# Patient Record
Sex: FEMALE | Race: BLACK OR AFRICAN AMERICAN | Employment: UNEMPLOYED | URBAN - METROPOLITAN AREA
[De-identification: names, ages, dates, MRNs, and addresses within clinical notes are randomized per-mention and may not be internally consistent; named-entity substitution may affect disease eponyms.]

---

## 2018-09-13 LAB
CHLAMYDIA, EXTERNAL: NEGATIVE
HBSAG, EXTERNAL: NEGATIVE
HIV, EXTERNAL: NORMAL
N. GONORRHEA, EXTERNAL: NEGATIVE
RPR, EXTERNAL: NORMAL
RUBELLA, EXTERNAL: NORMAL

## 2019-02-13 ENCOUNTER — HOSPITAL ENCOUNTER (OUTPATIENT)
Dept: LAB | Age: 44
Discharge: HOME | End: 2019-02-13

## 2019-02-13 LAB — XX-LABCORP SPECIMEN COL,LCBCF: NORMAL

## 2019-02-13 PROCEDURE — 99001 SPECIMEN HANDLING PT-LAB: CPT

## 2019-02-15 ENCOUNTER — ANESTHESIA EVENT (OUTPATIENT)
Dept: LABOR AND DELIVERY | Age: 44
DRG: 540 | End: 2019-02-15
Payer: MEDICAID

## 2019-02-15 ENCOUNTER — ANESTHESIA (OUTPATIENT)
Dept: LABOR AND DELIVERY | Age: 44
DRG: 540 | End: 2019-02-15
Payer: MEDICAID

## 2019-02-15 ENCOUNTER — HOSPITAL ENCOUNTER (INPATIENT)
Age: 44
LOS: 4 days | Discharge: HOME | DRG: 540 | End: 2019-02-19
Attending: OBSTETRICS & GYNECOLOGY | Admitting: OBSTETRICS & GYNECOLOGY
Payer: MEDICAID

## 2019-02-15 DIAGNOSIS — Z98.891 S/P EMERGENCY CESAREAN SECTION: Primary | ICD-10-CM

## 2019-02-15 LAB
APTT PPP: 26.8 SEC (ref 23–36.4)
BASOPHILS # BLD: 0 K/UL (ref 0–0.1)
BASOPHILS NFR BLD: 0 % (ref 0–2)
D DIMER PPP FEU-MCNC: 1.59 UG/ML(FEU)
DIFFERENTIAL METHOD BLD: ABNORMAL
EOSINOPHIL # BLD: 0.1 K/UL (ref 0–0.4)
EOSINOPHIL NFR BLD: 1 % (ref 0–5)
ERYTHROCYTE [DISTWIDTH] IN BLOOD BY AUTOMATED COUNT: 14.5 % (ref 11.6–14.5)
FDP BLD QL AGGL: <5 UG/ML
FIBRINOGEN PPP-MCNC: 431 MG/DL (ref 210–451)
HCT VFR BLD AUTO: 29.6 % (ref 35–45)
HGB BLD-MCNC: 10.1 G/DL (ref 12–16)
LYMPHOCYTES # BLD: 3.5 K/UL (ref 0.9–3.6)
LYMPHOCYTES NFR BLD: 22 % (ref 21–52)
MCH RBC QN AUTO: 28.5 PG (ref 24–34)
MCHC RBC AUTO-ENTMCNC: 34.1 G/DL (ref 31–37)
MCV RBC AUTO: 83.6 FL (ref 74–97)
MONOCYTES # BLD: 1.3 K/UL (ref 0.05–1.2)
MONOCYTES NFR BLD: 8 % (ref 3–10)
NEUTS SEG # BLD: 10.9 K/UL (ref 1.8–8)
NEUTS SEG NFR BLD: 69 % (ref 40–73)
PLATELET # BLD AUTO: 233 K/UL (ref 135–420)
PMV BLD AUTO: 11.3 FL (ref 9.2–11.8)
RBC # BLD AUTO: 3.54 M/UL (ref 4.2–5.3)
WBC # BLD AUTO: 15.8 K/UL (ref 4.6–13.2)

## 2019-02-15 PROCEDURE — 75410000003 HC RECOV DEL/VAG/CSECN EA 0.5 HR: Performed by: OBSTETRICS & GYNECOLOGY

## 2019-02-15 PROCEDURE — 77030008683 HC TU ET CUF COVD -A: Performed by: ANESTHESIOLOGY

## 2019-02-15 PROCEDURE — 65270000029 HC RM PRIVATE

## 2019-02-15 PROCEDURE — 85362 FIBRIN DEGRADATION PRODUCTS: CPT

## 2019-02-15 PROCEDURE — 74011000258 HC RX REV CODE- 258

## 2019-02-15 PROCEDURE — 86900 BLOOD TYPING SEROLOGIC ABO: CPT

## 2019-02-15 PROCEDURE — 77030031139 HC SUT VCRL2 J&J -A: Performed by: OBSTETRICS & GYNECOLOGY

## 2019-02-15 PROCEDURE — 85379 FIBRIN DEGRADATION QUANT: CPT

## 2019-02-15 PROCEDURE — 74011250636 HC RX REV CODE- 250/636

## 2019-02-15 PROCEDURE — 77030002888 HC SUT CHRMC J&J -A: Performed by: OBSTETRICS & GYNECOLOGY

## 2019-02-15 PROCEDURE — 77030002933 HC SUT MCRYL J&J -A: Performed by: OBSTETRICS & GYNECOLOGY

## 2019-02-15 PROCEDURE — 76010000392 HC C SECN EA ADDL 0.5 HR: Performed by: OBSTETRICS & GYNECOLOGY

## 2019-02-15 PROCEDURE — 86870 RBC ANTIBODY IDENTIFICATION: CPT

## 2019-02-15 PROCEDURE — 85025 COMPLETE CBC W/AUTO DIFF WBC: CPT

## 2019-02-15 PROCEDURE — 85730 THROMBOPLASTIN TIME PARTIAL: CPT

## 2019-02-15 PROCEDURE — 85384 FIBRINOGEN ACTIVITY: CPT

## 2019-02-15 PROCEDURE — 76060000078 HC EPIDURAL ANESTHESIA: Performed by: OBSTETRICS & GYNECOLOGY

## 2019-02-15 PROCEDURE — 76010000391 HC C SECN FIRST 1 HR: Performed by: OBSTETRICS & GYNECOLOGY

## 2019-02-15 PROCEDURE — 74011250636 HC RX REV CODE- 250/636: Performed by: OBSTETRICS & GYNECOLOGY

## 2019-02-15 PROCEDURE — 74011250637 HC RX REV CODE- 250/637

## 2019-02-15 PROCEDURE — 77030002974 HC SUT PLN J&J -A: Performed by: OBSTETRICS & GYNECOLOGY

## 2019-02-15 PROCEDURE — 74011000250 HC RX REV CODE- 250

## 2019-02-15 PROCEDURE — 86920 COMPATIBILITY TEST SPIN: CPT

## 2019-02-15 RX ORDER — HYDROMORPHONE HYDROCHLORIDE 1 MG/ML
0.5 INJECTION, SOLUTION INTRAMUSCULAR; INTRAVENOUS; SUBCUTANEOUS
Status: COMPLETED | OUTPATIENT
Start: 2019-02-15 | End: 2019-02-16

## 2019-02-15 RX ORDER — HYDROCODONE BITARTRATE AND ACETAMINOPHEN 5; 325 MG/1; MG/1
1 TABLET ORAL AS NEEDED
Status: DISCONTINUED | OUTPATIENT
Start: 2019-02-15 | End: 2019-02-19 | Stop reason: HOSPADM

## 2019-02-15 RX ORDER — CEFAZOLIN SODIUM 2 G/50ML
SOLUTION INTRAVENOUS
Status: DISPENSED
Start: 2019-02-15 | End: 2019-02-16

## 2019-02-15 RX ORDER — SODIUM CHLORIDE, SODIUM LACTATE, POTASSIUM CHLORIDE, CALCIUM CHLORIDE 600; 310; 30; 20 MG/100ML; MG/100ML; MG/100ML; MG/100ML
50 INJECTION, SOLUTION INTRAVENOUS CONTINUOUS
Status: DISCONTINUED | OUTPATIENT
Start: 2019-02-16 | End: 2019-02-19 | Stop reason: HOSPADM

## 2019-02-15 RX ORDER — FENTANYL CITRATE 50 UG/ML
INJECTION, SOLUTION INTRAMUSCULAR; INTRAVENOUS AS NEEDED
Status: DISCONTINUED | OUTPATIENT
Start: 2019-02-15 | End: 2019-02-16 | Stop reason: HOSPADM

## 2019-02-15 RX ORDER — SODIUM CHLORIDE 9 MG/ML
250 INJECTION, SOLUTION INTRAVENOUS AS NEEDED
Status: DISCONTINUED | OUTPATIENT
Start: 2019-02-15 | End: 2019-02-18 | Stop reason: SDUPTHER

## 2019-02-15 RX ORDER — ONDANSETRON 2 MG/ML
4 INJECTION INTRAMUSCULAR; INTRAVENOUS
Status: DISPENSED | OUTPATIENT
Start: 2019-02-15 | End: 2019-02-16

## 2019-02-15 RX ORDER — OXYTOCIN 10 [USP'U]/ML
INJECTION, SOLUTION INTRAMUSCULAR; INTRAVENOUS AS NEEDED
Status: DISCONTINUED | OUTPATIENT
Start: 2019-02-15 | End: 2019-02-16 | Stop reason: HOSPADM

## 2019-02-15 RX ORDER — SUCCINYLCHOLINE CHLORIDE 20 MG/ML
INJECTION INTRAMUSCULAR; INTRAVENOUS AS NEEDED
Status: DISCONTINUED | OUTPATIENT
Start: 2019-02-15 | End: 2019-02-16 | Stop reason: HOSPADM

## 2019-02-15 RX ORDER — TRISODIUM CITRATE DIHYDRATE AND CITRIC ACID MONOHYDRATE 500; 334 MG/5ML; MG/5ML
SOLUTION ORAL
Status: COMPLETED
Start: 2019-02-15 | End: 2019-02-15

## 2019-02-15 RX ORDER — SODIUM CHLORIDE 0.9 % (FLUSH) 0.9 %
5-40 SYRINGE (ML) INJECTION EVERY 8 HOURS
Status: DISCONTINUED | OUTPATIENT
Start: 2019-02-15 | End: 2019-02-16 | Stop reason: HOSPADM

## 2019-02-15 RX ORDER — TRANEXAMIC ACID 100 MG/ML
1 INJECTION, SOLUTION INTRAVENOUS ONCE
Status: DISPENSED | OUTPATIENT
Start: 2019-02-15 | End: 2019-02-16

## 2019-02-15 RX ORDER — SODIUM CHLORIDE 0.9 % (FLUSH) 0.9 %
5-40 SYRINGE (ML) INJECTION AS NEEDED
Status: DISCONTINUED | OUTPATIENT
Start: 2019-02-15 | End: 2019-02-16 | Stop reason: HOSPADM

## 2019-02-15 RX ORDER — PROPOFOL 10 MG/ML
INJECTION, EMULSION INTRAVENOUS AS NEEDED
Status: DISCONTINUED | OUTPATIENT
Start: 2019-02-15 | End: 2019-02-16 | Stop reason: HOSPADM

## 2019-02-15 RX ORDER — FAMOTIDINE 10 MG/ML
INJECTION INTRAVENOUS
Status: COMPLETED
Start: 2019-02-15 | End: 2019-02-15

## 2019-02-15 RX ORDER — ONDANSETRON 2 MG/ML
INJECTION INTRAMUSCULAR; INTRAVENOUS AS NEEDED
Status: DISCONTINUED | OUTPATIENT
Start: 2019-02-15 | End: 2019-02-16 | Stop reason: HOSPADM

## 2019-02-15 RX ORDER — FENTANYL CITRATE 50 UG/ML
25 INJECTION, SOLUTION INTRAMUSCULAR; INTRAVENOUS AS NEEDED
Status: DISCONTINUED | OUTPATIENT
Start: 2019-02-15 | End: 2019-02-19 | Stop reason: HOSPADM

## 2019-02-15 RX ORDER — BETAMETHASONE SODIUM PHOSPHATE AND BETAMETHASONE ACETATE 3; 3 MG/ML; MG/ML
12 INJECTION, SUSPENSION INTRA-ARTICULAR; INTRALESIONAL; INTRAMUSCULAR; SOFT TISSUE ONCE
Status: COMPLETED | OUTPATIENT
Start: 2019-02-15 | End: 2019-02-15

## 2019-02-15 RX ORDER — LIDOCAINE HYDROCHLORIDE 20 MG/ML
INJECTION, SOLUTION EPIDURAL; INFILTRATION; INTRACAUDAL; PERINEURAL AS NEEDED
Status: DISCONTINUED | OUTPATIENT
Start: 2019-02-15 | End: 2019-02-16 | Stop reason: HOSPADM

## 2019-02-15 RX ORDER — SODIUM CHLORIDE, SODIUM LACTATE, POTASSIUM CHLORIDE, CALCIUM CHLORIDE 600; 310; 30; 20 MG/100ML; MG/100ML; MG/100ML; MG/100ML
INJECTION, SOLUTION INTRAVENOUS
Status: DISCONTINUED | OUTPATIENT
Start: 2019-02-15 | End: 2019-02-16 | Stop reason: HOSPADM

## 2019-02-15 RX ORDER — OXYTOCIN/RINGER'S LACTATE 20/1000 ML
PLASTIC BAG, INJECTION (ML) INTRAVENOUS
Status: DISPENSED
Start: 2019-02-15 | End: 2019-02-16

## 2019-02-15 RX ADMIN — ONDANSETRON 4 MG: 2 INJECTION INTRAMUSCULAR; INTRAVENOUS at 23:10

## 2019-02-15 RX ADMIN — FENTANYL CITRATE 50 MCG: 50 INJECTION, SOLUTION INTRAMUSCULAR; INTRAVENOUS at 23:13

## 2019-02-15 RX ADMIN — FENTANYL CITRATE 50 MCG: 50 INJECTION, SOLUTION INTRAMUSCULAR; INTRAVENOUS at 23:07

## 2019-02-15 RX ADMIN — PROPOFOL 130 MG: 10 INJECTION, EMULSION INTRAVENOUS at 23:00

## 2019-02-15 RX ADMIN — BETAMETHASONE ACETATE AND BETAMETHASONE SODIUM PHOSPHATE 12 MG: 3; 3 INJECTION, SUSPENSION INTRA-ARTICULAR; INTRALESIONAL; INTRAMUSCULAR; SOFT TISSUE at 22:42

## 2019-02-15 RX ADMIN — OXYTOCIN 20 UNITS: 10 INJECTION, SOLUTION INTRAMUSCULAR; INTRAVENOUS at 23:07

## 2019-02-15 RX ADMIN — FAMOTIDINE: 10 INJECTION, SOLUTION INTRAVENOUS at 22:39

## 2019-02-15 RX ADMIN — OXYTOCIN 20 UNITS: 10 INJECTION, SOLUTION INTRAMUSCULAR; INTRAVENOUS at 23:13

## 2019-02-15 RX ADMIN — LIDOCAINE HYDROCHLORIDE 80 MG: 20 INJECTION, SOLUTION EPIDURAL; INFILTRATION; INTRACAUDAL; PERINEURAL at 23:00

## 2019-02-15 RX ADMIN — SODIUM CHLORIDE, SODIUM LACTATE, POTASSIUM CHLORIDE, CALCIUM CHLORIDE: 600; 310; 30; 20 INJECTION, SOLUTION INTRAVENOUS at 22:47

## 2019-02-15 RX ADMIN — Medication 2 G: at 22:52

## 2019-02-15 RX ADMIN — SUCCINYLCHOLINE CHLORIDE 140 MG: 20 INJECTION INTRAMUSCULAR; INTRAVENOUS at 23:00

## 2019-02-15 RX ADMIN — SODIUM CHLORIDE, SODIUM LACTATE, POTASSIUM CHLORIDE, CALCIUM CHLORIDE: 600; 310; 30; 20 INJECTION, SOLUTION INTRAVENOUS at 23:07

## 2019-02-15 RX ADMIN — SODIUM CITRATE AND CITRIC ACID MONOHYDRATE 30 ML: 500; 334 SOLUTION ORAL at 22:39

## 2019-02-16 ENCOUNTER — DOCUMENTATION ONLY (OUTPATIENT)
Dept: OBGYN CLINIC | Age: 44
End: 2019-02-16

## 2019-02-16 PROBLEM — Z34.90 PREGNANCY: Status: ACTIVE | Noted: 2019-02-16

## 2019-02-16 PROBLEM — O44.00 PLACENTA PREVIA AFFECTING DELIVERY: Status: ACTIVE | Noted: 2019-02-16

## 2019-02-16 LAB
BASOPHILS # BLD: 0 K/UL (ref 0–0.06)
BASOPHILS # BLD: 0.3 K/UL (ref 0–0.06)
BASOPHILS NFR BLD: 0 % (ref 0–3)
BASOPHILS NFR BLD: 1 % (ref 0–3)
DIFFERENTIAL METHOD BLD: ABNORMAL
DIFFERENTIAL METHOD BLD: ABNORMAL
EOSINOPHIL # BLD: 0 K/UL (ref 0–0.4)
EOSINOPHIL # BLD: 0 K/UL (ref 0–0.4)
EOSINOPHIL NFR BLD: 0 % (ref 0–5)
EOSINOPHIL NFR BLD: 0 % (ref 0–5)
ERYTHROCYTE [DISTWIDTH] IN BLOOD BY AUTOMATED COUNT: 14.4 % (ref 11.6–14.5)
ERYTHROCYTE [DISTWIDTH] IN BLOOD BY AUTOMATED COUNT: 14.4 % (ref 11.6–14.5)
ERYTHROCYTE [DISTWIDTH] IN BLOOD BY AUTOMATED COUNT: 14.7 % (ref 11.6–14.5)
ERYTHROCYTE [DISTWIDTH] IN BLOOD BY AUTOMATED COUNT: 14.8 % (ref 11.6–14.5)
FIBRINOGEN PPP-MCNC: 177 MG/DL (ref 210–451)
HCT VFR BLD AUTO: 20.7 % (ref 35–45)
HCT VFR BLD AUTO: 23.8 % (ref 35–45)
HCT VFR BLD AUTO: 25 % (ref 35–45)
HCT VFR BLD AUTO: 25.3 % (ref 35–45)
HGB BLD-MCNC: 6.8 G/DL (ref 12–16)
HGB BLD-MCNC: 8 G/DL (ref 12–16)
HGB BLD-MCNC: 8.3 G/DL (ref 12–16)
HGB BLD-MCNC: 8.5 G/DL (ref 12–16)
INR PPP: 1.2 (ref 0.8–1.2)
LYMPHOCYTES # BLD: 0.7 K/UL (ref 0.8–3.5)
LYMPHOCYTES # BLD: 1.3 K/UL (ref 0.8–3.5)
LYMPHOCYTES NFR BLD: 3 % (ref 20–51)
LYMPHOCYTES NFR BLD: 5 % (ref 20–51)
MCH RBC QN AUTO: 27.9 PG (ref 24–34)
MCH RBC QN AUTO: 27.9 PG (ref 24–34)
MCH RBC QN AUTO: 28.4 PG (ref 24–34)
MCH RBC QN AUTO: 29.6 PG (ref 24–34)
MCHC RBC AUTO-ENTMCNC: 32 G/DL (ref 31–37)
MCHC RBC AUTO-ENTMCNC: 32.9 G/DL (ref 31–37)
MCHC RBC AUTO-ENTMCNC: 33.6 G/DL (ref 31–37)
MCHC RBC AUTO-ENTMCNC: 34.9 G/DL (ref 31–37)
MCV RBC AUTO: 84.6 FL (ref 74–97)
MCV RBC AUTO: 84.8 FL (ref 74–97)
MCV RBC AUTO: 85 FL (ref 74–97)
MCV RBC AUTO: 87.1 FL (ref 74–97)
MONOCYTES # BLD: 1.8 K/UL (ref 0–1)
MONOCYTES # BLD: 1.9 K/UL (ref 0–1)
MONOCYTES NFR BLD: 7 % (ref 2–9)
MONOCYTES NFR BLD: 8 % (ref 2–9)
NEUTS BAND NFR BLD MANUAL: 15 % (ref 0–5)
NEUTS BAND NFR BLD MANUAL: 20 % (ref 0–5)
NEUTS SEG # BLD: 21.3 K/UL (ref 1.8–8)
NEUTS SEG # BLD: 22.1 K/UL (ref 1.8–8)
NEUTS SEG NFR BLD: 67 % (ref 42–75)
NEUTS SEG NFR BLD: 74 % (ref 42–75)
PLATELET # BLD AUTO: 107 K/UL (ref 135–420)
PLATELET # BLD AUTO: 129 K/UL (ref 135–420)
PLATELET # BLD AUTO: 145 K/UL (ref 135–420)
PLATELET # BLD AUTO: 159 K/UL (ref 135–420)
PLATELET COMMENTS,PCOM: ABNORMAL
PLATELET COMMENTS,PCOM: ABNORMAL
PMV BLD AUTO: 10.8 FL (ref 9.2–11.8)
PMV BLD AUTO: 11.1 FL (ref 9.2–11.8)
PMV BLD AUTO: 11.3 FL (ref 9.2–11.8)
PMV BLD AUTO: 11.5 FL (ref 9.2–11.8)
PROTHROMBIN TIME: 15.1 SEC (ref 11.5–15.2)
RBC # BLD AUTO: 2.44 M/UL (ref 4.2–5.3)
RBC # BLD AUTO: 2.8 M/UL (ref 4.2–5.3)
RBC # BLD AUTO: 2.87 M/UL (ref 4.2–5.3)
RBC # BLD AUTO: 2.99 M/UL (ref 4.2–5.3)
RBC MORPH BLD: ABNORMAL
WBC # BLD AUTO: 23.9 K/UL (ref 4.6–13.2)
WBC # BLD AUTO: 25.5 K/UL (ref 4.6–13.2)
WBC # BLD AUTO: 27.9 K/UL (ref 4.6–13.2)
WBC # BLD AUTO: 29.7 K/UL (ref 4.6–13.2)

## 2019-02-16 PROCEDURE — 59025 FETAL NON-STRESS TEST: CPT

## 2019-02-16 PROCEDURE — 74011250636 HC RX REV CODE- 250/636: Performed by: OBSTETRICS & GYNECOLOGY

## 2019-02-16 PROCEDURE — P9016 RBC LEUKOCYTES REDUCED: HCPCS

## 2019-02-16 PROCEDURE — 36430 TRANSFUSION BLD/BLD COMPNT: CPT

## 2019-02-16 PROCEDURE — 75410000003 HC RECOV DEL/VAG/CSECN EA 0.5 HR

## 2019-02-16 PROCEDURE — 75410000002 HC LABOR FEE PER 1 HR

## 2019-02-16 PROCEDURE — 85610 PROTHROMBIN TIME: CPT

## 2019-02-16 PROCEDURE — 74011250636 HC RX REV CODE- 250/636: Performed by: NURSE ANESTHETIST, CERTIFIED REGISTERED

## 2019-02-16 PROCEDURE — 88307 TISSUE EXAM BY PATHOLOGIST: CPT

## 2019-02-16 PROCEDURE — 77030027138 HC INCENT SPIROMETER -A

## 2019-02-16 PROCEDURE — 74011250637 HC RX REV CODE- 250/637: Performed by: OBSTETRICS & GYNECOLOGY

## 2019-02-16 PROCEDURE — 85025 COMPLETE CBC W/AUTO DIFF WBC: CPT

## 2019-02-16 PROCEDURE — 86902 BLOOD TYPE ANTIGEN DONOR EA: CPT

## 2019-02-16 PROCEDURE — 30233N1 TRANSFUSION OF NONAUTOLOGOUS RED BLOOD CELLS INTO PERIPHERAL VEIN, PERCUTANEOUS APPROACH: ICD-10-PCS | Performed by: OBSTETRICS & GYNECOLOGY

## 2019-02-16 PROCEDURE — 36415 COLL VENOUS BLD VENIPUNCTURE: CPT

## 2019-02-16 PROCEDURE — 74011250636 HC RX REV CODE- 250/636

## 2019-02-16 PROCEDURE — 65270000029 HC RM PRIVATE

## 2019-02-16 PROCEDURE — 86921 COMPATIBILITY TEST INCUBATE: CPT

## 2019-02-16 PROCEDURE — 85027 COMPLETE CBC AUTOMATED: CPT

## 2019-02-16 PROCEDURE — 99284 EMERGENCY DEPT VISIT MOD MDM: CPT

## 2019-02-16 PROCEDURE — 86922 COMPATIBILITY TEST ANTIGLOB: CPT

## 2019-02-16 PROCEDURE — 85384 FIBRINOGEN ACTIVITY: CPT

## 2019-02-16 RX ORDER — SODIUM CHLORIDE 0.9 % (FLUSH) 0.9 %
5-40 SYRINGE (ML) INJECTION AS NEEDED
Status: DISCONTINUED | OUTPATIENT
Start: 2019-02-16 | End: 2019-02-19 | Stop reason: HOSPADM

## 2019-02-16 RX ORDER — PROMETHAZINE HYDROCHLORIDE 25 MG/ML
25 INJECTION, SOLUTION INTRAMUSCULAR; INTRAVENOUS
Status: DISCONTINUED | OUTPATIENT
Start: 2019-02-16 | End: 2019-02-19 | Stop reason: HOSPADM

## 2019-02-16 RX ORDER — CEFAZOLIN SODIUM 2 G/50ML
2 SOLUTION INTRAVENOUS ONCE
Status: COMPLETED | OUTPATIENT
Start: 2019-02-16 | End: 2019-02-17

## 2019-02-16 RX ORDER — CEFAZOLIN SODIUM 2 G/50ML
2 SOLUTION INTRAVENOUS EVERY 8 HOURS
Status: DISPENSED | OUTPATIENT
Start: 2019-02-16 | End: 2019-02-17

## 2019-02-16 RX ORDER — SIMETHICONE 80 MG
80 TABLET,CHEWABLE ORAL
Status: DISCONTINUED | OUTPATIENT
Start: 2019-02-16 | End: 2019-02-19 | Stop reason: HOSPADM

## 2019-02-16 RX ORDER — OXYCODONE AND ACETAMINOPHEN 5; 325 MG/1; MG/1
1-2 TABLET ORAL
Status: DISCONTINUED | OUTPATIENT
Start: 2019-02-16 | End: 2019-02-19 | Stop reason: HOSPADM

## 2019-02-16 RX ORDER — OXYTOCIN/RINGER'S LACTATE 20/1000 ML
125 PLASTIC BAG, INJECTION (ML) INTRAVENOUS CONTINUOUS
Status: DISCONTINUED | OUTPATIENT
Start: 2019-02-16 | End: 2019-02-19 | Stop reason: HOSPADM

## 2019-02-16 RX ORDER — MISOPROSTOL 200 UG/1
1000 TABLET ORAL ONCE
Status: COMPLETED | OUTPATIENT
Start: 2019-02-16 | End: 2019-02-16

## 2019-02-16 RX ORDER — PHENYLEPHRINE HCL IN 0.9% NACL 1 MG/10 ML
SYRINGE (ML) INTRAVENOUS AS NEEDED
Status: DISCONTINUED | OUTPATIENT
Start: 2019-02-16 | End: 2019-02-16 | Stop reason: HOSPADM

## 2019-02-16 RX ORDER — IBUPROFEN 400 MG/1
800 TABLET ORAL
Status: DISCONTINUED | OUTPATIENT
Start: 2019-02-19 | End: 2019-02-16

## 2019-02-16 RX ORDER — ZOLPIDEM TARTRATE 5 MG/1
5 TABLET ORAL
Status: DISCONTINUED | OUTPATIENT
Start: 2019-02-16 | End: 2019-02-19 | Stop reason: HOSPADM

## 2019-02-16 RX ORDER — SODIUM CHLORIDE 0.9 % (FLUSH) 0.9 %
5-40 SYRINGE (ML) INJECTION EVERY 8 HOURS
Status: DISCONTINUED | OUTPATIENT
Start: 2019-02-16 | End: 2019-02-19 | Stop reason: HOSPADM

## 2019-02-16 RX ORDER — METHYLERGONOVINE MALEATE 0.2 MG/ML
0.2 INJECTION INTRAVENOUS ONCE
Status: COMPLETED | OUTPATIENT
Start: 2019-02-16 | End: 2019-02-16

## 2019-02-16 RX ORDER — MORPHINE SULFATE 2 MG/ML
2 INJECTION, SOLUTION INTRAMUSCULAR; INTRAVENOUS
Status: DISCONTINUED | OUTPATIENT
Start: 2019-02-16 | End: 2019-02-16

## 2019-02-16 RX ORDER — MISOPROSTOL 200 UG/1
TABLET ORAL
Status: DISPENSED
Start: 2019-02-16 | End: 2019-02-16

## 2019-02-16 RX ORDER — OXYTOCIN/RINGER'S LACTATE 20/1000 ML
PLASTIC BAG, INJECTION (ML) INTRAVENOUS
Status: DISPENSED
Start: 2019-02-16 | End: 2019-02-16

## 2019-02-16 RX ORDER — SODIUM CHLORIDE, SODIUM LACTATE, POTASSIUM CHLORIDE, CALCIUM CHLORIDE 600; 310; 30; 20 MG/100ML; MG/100ML; MG/100ML; MG/100ML
125 INJECTION, SOLUTION INTRAVENOUS CONTINUOUS
Status: DISPENSED | OUTPATIENT
Start: 2019-02-16 | End: 2019-02-17

## 2019-02-16 RX ORDER — SODIUM CHLORIDE 9 MG/ML
250 INJECTION, SOLUTION INTRAVENOUS AS NEEDED
Status: DISCONTINUED | OUTPATIENT
Start: 2019-02-16 | End: 2019-02-19 | Stop reason: SDUPTHER

## 2019-02-16 RX ORDER — CEFAZOLIN SODIUM IN 0.9 % NACL 2 G/100 ML
PLASTIC BAG, INJECTION (ML) INTRAVENOUS AS NEEDED
Status: DISCONTINUED | OUTPATIENT
Start: 2019-02-15 | End: 2019-02-16 | Stop reason: HOSPADM

## 2019-02-16 RX ORDER — FACIAL-BODY WIPES
10 EACH TOPICAL
Status: DISCONTINUED | OUTPATIENT
Start: 2019-02-16 | End: 2019-02-19 | Stop reason: HOSPADM

## 2019-02-16 RX ORDER — METHYLERGONOVINE MALEATE 0.2 MG/ML
INJECTION INTRAVENOUS
Status: COMPLETED
Start: 2019-02-16 | End: 2019-02-16

## 2019-02-16 RX ORDER — KETOROLAC TROMETHAMINE 30 MG/ML
30 INJECTION, SOLUTION INTRAMUSCULAR; INTRAVENOUS EVERY 6 HOURS
Status: DISCONTINUED | OUTPATIENT
Start: 2019-02-16 | End: 2019-02-16

## 2019-02-16 RX ORDER — ACETAMINOPHEN 325 MG/1
650 TABLET ORAL
Status: DISCONTINUED | OUTPATIENT
Start: 2019-02-16 | End: 2019-02-19 | Stop reason: HOSPADM

## 2019-02-16 RX ORDER — HYDROMORPHONE HYDROCHLORIDE 2 MG/ML
INJECTION, SOLUTION INTRAMUSCULAR; INTRAVENOUS; SUBCUTANEOUS AS NEEDED
Status: DISCONTINUED | OUTPATIENT
Start: 2019-02-16 | End: 2019-02-16 | Stop reason: HOSPADM

## 2019-02-16 RX ADMIN — HYDROMORPHONE HYDROCHLORIDE 0.5 MG: 1 INJECTION, SOLUTION INTRAMUSCULAR; INTRAVENOUS; SUBCUTANEOUS at 01:09

## 2019-02-16 RX ADMIN — METHYLERGONOVINE MALEATE 0.2 MG: 0.2 INJECTION, SOLUTION INTRAMUSCULAR; INTRAVENOUS at 02:29

## 2019-02-16 RX ADMIN — MORPHINE SULFATE 2 MG: 2 INJECTION, SOLUTION INTRAMUSCULAR; INTRAVENOUS at 11:10

## 2019-02-16 RX ADMIN — MORPHINE SULFATE 2 MG: 2 INJECTION, SOLUTION INTRAMUSCULAR; INTRAVENOUS at 13:14

## 2019-02-16 RX ADMIN — OXYCODONE AND ACETAMINOPHEN 2 TABLET: 5; 325 TABLET ORAL at 20:24

## 2019-02-16 RX ADMIN — CEFAZOLIN SODIUM 2 G: 2 SOLUTION INTRAVENOUS at 07:45

## 2019-02-16 RX ADMIN — Medication 200 MCG: at 00:11

## 2019-02-16 RX ADMIN — HYDROMORPHONE HYDROCHLORIDE 0.5 MG: 1 INJECTION, SOLUTION INTRAMUSCULAR; INTRAVENOUS; SUBCUTANEOUS at 04:04

## 2019-02-16 RX ADMIN — HYDROMORPHONE HYDROCHLORIDE 1 MG: 2 INJECTION, SOLUTION INTRAMUSCULAR; INTRAVENOUS; SUBCUTANEOUS at 00:34

## 2019-02-16 RX ADMIN — MORPHINE SULFATE 2 MG: 2 INJECTION, SOLUTION INTRAMUSCULAR; INTRAVENOUS at 06:37

## 2019-02-16 RX ADMIN — CEFAZOLIN SODIUM 2 G: 2 SOLUTION INTRAVENOUS at 15:19

## 2019-02-16 RX ADMIN — MISOPROSTOL 1000 MCG: 200 TABLET ORAL at 02:26

## 2019-02-16 RX ADMIN — Medication 200 MCG: at 00:06

## 2019-02-16 RX ADMIN — HYDROMORPHONE HYDROCHLORIDE 1 MG: 2 INJECTION, SOLUTION INTRAMUSCULAR; INTRAVENOUS; SUBCUTANEOUS at 00:55

## 2019-02-16 RX ADMIN — OXYCODONE AND ACETAMINOPHEN 2 TABLET: 5; 325 TABLET ORAL at 23:59

## 2019-02-16 RX ADMIN — HYDROMORPHONE HYDROCHLORIDE 0.5 MG: 1 INJECTION, SOLUTION INTRAMUSCULAR; INTRAVENOUS; SUBCUTANEOUS at 01:19

## 2019-02-16 RX ADMIN — HYDROMORPHONE HYDROCHLORIDE 0.5 MG: 1 INJECTION, SOLUTION INTRAMUSCULAR; INTRAVENOUS; SUBCUTANEOUS at 04:45

## 2019-02-16 RX ADMIN — CEFAZOLIN SODIUM 2 G: 2 SOLUTION INTRAVENOUS at 23:59

## 2019-02-16 RX ADMIN — METHYLERGONOVINE MALEATE 0.2 MG: 0.2 INJECTION INTRAVENOUS at 02:29

## 2019-02-16 RX ADMIN — OXYCODONE AND ACETAMINOPHEN 2 TABLET: 5; 325 TABLET ORAL at 16:20

## 2019-02-16 RX ADMIN — SODIUM CHLORIDE, SODIUM LACTATE, POTASSIUM CHLORIDE, CALCIUM CHLORIDE: 600; 310; 30; 20 INJECTION, SOLUTION INTRAVENOUS at 00:20

## 2019-02-16 RX ADMIN — Medication 200 MCG: at 00:08

## 2019-02-16 NOTE — PROGRESS NOTES
2209: pt arrived to unit via wheelchair accompanied by family member and L&D Tech. Pt in room, pants heavily saturated in blood, placed on monitor. IV placed. Pt having painful contractions. 2212: MD called 26: MD called for c/s 
 
2230: IV infiltrated anesthesia at bedside, New IV placed 2244: pt out of room to OR 
 
0155: pt arrived on unit from PACU with RN and Tech. Fundus firm at U with Heavy bleeding, pads under pt saturated. Pads changed will reassess. 0215: pads saturated again 0219: SBAR to MD skinner, orders received 
 
 
0320: SBAR to MD Skinner, orders received, have ready at bedside: US, Rowdy, sterile spec and ring forceps. 9856: MD at bedside Spec exam to assess bleeding. Bleeding has improved since 0315. MD ordered for pt to receive 2 units PRBCs now. Blood bank called and still working on T&C d/t pt antibodies in blood. ([id number])

## 2019-02-16 NOTE — ANESTHESIA PREPROCEDURE EVALUATION
Anesthetic History No history of anesthetic complications Review of Systems / Medical History Patient summary reviewed, nursing notes reviewed and pertinent labs reviewed Pulmonary Within defined limits Neuro/Psych Within defined limits Cardiovascular Within defined limits Exercise tolerance: >4 METS 
  
GI/Hepatic/Renal 
Within defined limits Endo/Other Within defined limits Other Findings Physical Exam 
 
Airway Mallampati: II 
TM Distance: 4 - 6 cm Neck ROM: normal range of motion Mouth opening: Normal 
 
 Cardiovascular Rhythm: regular Rate: normal 
 
 
 
 Dental 
No notable dental hx Pulmonary Breath sounds clear to auscultation Abdominal 
GI exam deferred Other Findings Anesthetic Plan ASA: 2 Anesthesia type: general 
 
 
 
 
Induction: Intravenous Anesthetic plan and risks discussed with: Patient

## 2019-02-16 NOTE — H&P
History & Physical 
LATE ENTRY: pt seen and examined around 1035p Name: Sonya House MRN: [id number]  SSN: [id number] YOB: 1975  Age: 41 y.o. Sex: female Subjective:  
 
Estimated Date of Delivery: 3/22/19 OB History  Para Term  AB Living 6 3 3   2 3 SAB TAB Ectopic Molar Multiple Live Births 1 1       3 Ms. Kamron presents for evaluation of pregnancy at 35w0d for heavy vaginal bleeding that started within the last hour. She also c/o strong contractions. She denies lof, +FM. She states she went to use the bathroom and noticed a gush of blood. She states she soaked through two towels. On L&D her pants and pad were noted to be soaked with blood Discussed patient with [facility] resident on L&D Prenatal course was complicated by posterior placenta previa, polyhydramnios and two previous  sections. Pt sees [facility] for her prenatal care and was scheduled for C/S at 39wks Please see prenatal records for details. No past medical history on file. Past Surgical History:  
Procedure Laterality Date  HX BREAST AUGMENTATION Bilateral   HX BREAST LUMPECTOMY Left  Allergies Allergen Reactions  Latex, Natural Rubber Rash  Amoxicillin Hives Prior to Admission medications Medication Sig Start Date End Date Taking? Authorizing Provider  
aspirin delayed-release 81 mg tablet Take 81 mg by mouth daily. Provider, Historical  
prenatal vit-iron fumarate-fa (RIGHT STEP PRENATAL VITAMINS) 27 mg iron- 0.8 mg tab tablet Take 1 Tab by mouth daily. Provider, Historical  
  
Social History Occupational History  Not on file Tobacco Use  Smoking status: Never Smoker Substance and Sexual Activity  Alcohol use: No  
 Drug use: No  
 Sexual activity: Not on file No family history on file. Review of Systems: A comprehensive review of systems was negative except for that written in the HPI. Objective: Vitals: There were no vitals filed for this visit. Physical Exam: 
Patient with distress. Abdomen: moderately tender in the lower abdomen, nondistended Fundus: firm and  tender Pelvic: Harrison  soaked with bright red blood with clots noted at the introitus Membranes:  Intact Fetal Heart Rate: Reactive Baseline: 130 per minute Variability: moderate Accelerations: yes Decelerations: none Uterine contractions: regular, every 2-3 minutes Prenatal Labs:  
No results found for: RUBELLAEXT, GRBSEXT, HBSAGEXT, HIVEXT, RPREXT, GONNOEXT, CHLAMEXT No results found for this or any previous visit (from the past 24 hour(s)). Assessment/Plan:  
 
43 @35w h/o two previous C/S, placenta previa, and polyhdramnios presents with heavy vaginal bleeding and abdominal/fundal tenderness concerning for abruption, FHT reassuring, AFVSS Plan:  
Urgent C-section called @ 1020p OR team aware. Anesthesia at bedside Pt ate prior to arrival-->will require general anesthesia. T&X 2 units prbcs and Tranexamic acid on call to OR 
IVF Discussed plan of care with patient and family. Answered all of their questions. R/B/A C/S discussed including infection, bleeding, blood transfusion, injury to internal organs, baby,  with increased morbidity and mortality. All of her questions answered. Consent signed Signed By:  Chris Stallings MD   
 February 15, 2019 10:35 PM

## 2019-02-16 NOTE — PROGRESS NOTES
750-Verbal shift change report given to ALONSO WYATT (oncoming nurse) by Edith Alexander RN (offgoing nurse). Report included the following information SBAR, Kardex, Intake/Output and Recent Results. Pt lying in bed resting quietly. 1st unit blood infusing at 200 ml/hr, Left hand CDI. Ancef 2 g given right AC IV. Unable to assess fundus, abdomen distended, soft, with scant bleeding on pad. Replaced, and measured with 25 ml QBL. 1100-2nd unit blood completed. 1300-Pt c/o 10/10 abdominal pain at incision, and lower abdomen. Dr. Elsi Street called for morphine not due at this time. Toradol held per physician. Give 2 mg morphine IV now. Pillow provided to splint abdomen. 1330-Pad changed, QBL 40 ml.  
 
1500-Lab called for timed lab 1430, phlebotomy on her way per Crystal 
 
1540-Dr. Skinner at bedside for assessment. Fundus firm, scant rubra. CBC to determine valles removal, pt to stand, and po medication. 1600-Labs received, to [facility] per Dr. Elsi Street 1615-Pt sitting at edge of bed, dangle feet before ambulation, no c/o dizziness. pt ambulated to wheelchair. Percocet given x 2 for abdominal incisional pain. 1640-Pt transferred to [facility] 2207 via wheelchair. Report given to ALONSO Jimenez and assumed care. 1641-Pt transported to Nursery via wheelchair to visit baby.

## 2019-02-16 NOTE — OP NOTES
PREOPERATIVE DIAGNOSES  1. Intrauterine pregnancy at 35wks  2.  urgent repeat  section  3. Placenta previa with maternal hemorrhage  POSTOPERATIVE DIAGNOSES  1. Intrauterine pregnancy at term. 2. Urgent repeat  section,  3. Same as above  4. Partial uterine rupture  PROCEDURE PERFORMED: Repeat low-transverse  section of a viable Male  infant. SURGEON: Gerry Ayoub MD    ASSIST:  Lazaro Duty    ANESTHESIA:  General    ESTIMATED BLOOD LOSS: 900 mL. (total EBL from admission 1.5L)    SPECIMEN: None. FINDINGS: A viable Male infant delivered from the vertex presentation, Apgar's 8, 8  and a weight of  5lb 11oz. There was clear amniotic fluid with normal tubes and ovaries bilaterally. Obliterated lower uterine segment with partial uterine rupture     COMPLICATIONS: None    DESCRIPTION OF PROCEDURE:    After  consent was obtained, the patient was brought to the operating room and correctly identified. The patient received 2 g of intravenous Ancef on call for the operating room. A general was administered by anesthesia. The patient was placed in the supine position with a roll under her right hip. Sequential compression boots were placed on her legs and a Rondon catheter was placed in her bladder, which drained clear yellow urine. Fetal heart tones were confirmed to be 122  The patient was prepped and draped in the usual sterile fashion/ A time out was performed. Using the scalpel, a Pfannenstiel skin incision was made along her previous incision/The incision was carried down to the level of the fascia with the scalpel. The fascia was nicked in the midline and extended transversely through blunt dissection. The superior and inferior rectus fascia was sequentially grasped with Kocher clamps and the underlying rectus muscles were bluntly dissected off. The median raphe was  with Miller scissors and the rectus muscles were divided and the peritoneum was entered bluntly.  The peritoneal defect was made larger with sharp and blunt dissection. The bladder blade was placed and the bladder was noted to be attached anterior to the lower uterine segment. bladder flap was created with Metzenbaum scissors and blunt dissection with findings noted above. A low, transverse incision along the revealing amniotic sac and extended with cephalocaudad pull. A viable Male was delivered from the vertex presentation  with Apgar's of 8 and 8 and a weight of 5lb 11oz. The infant was suctioned on the operative field and the cord was doubly clamped. The infant was given to the nursery staff. A cord blood specimen was obtained as well as cord gases. The placenta was delivered via external fundal massage and manual extraction. Intravenous Pitocin  was administered. Tranexamic acid was also given prophylactically. The uterus was exteriorized and cleared of all clots and debris. The lower uterine segment was repaired with a running, locked fashion with 0 Vicryl. A second layer of imbricating sutures was placed using 0 Vicryl. Due to concern for possible bladder involvement given uterine rupture and obliterated lower uterine segment, attempted to call in for assistance however unable to find at that time. The bladder was backfilled x2 with sterile milk and noted to be intact with no extravasation. The ovaries and tubes were inspected and noted to be normal bilaterally. The uterus was returned to the patient's abdmen and the pelvic gutters were irrigated. The rectus muscles were loosely reapproximated with mattress sutures of #1 chromic. The fascia was closed in a running fashion with 0 Vicryl. The skin was closed in a subcuticular fashion with 4-0 Vicryl. The incision was cleaned and a pressure dressing was applied. At the end of the procedure, all sharps, sponge, and instrument counts were  correct, and the Rondon was draining clear yellow urine.     The patient tolerated the procedure well and she was taken to the recovery  room in stable condition.     Tutu Corona MD  2/16/2019  1:24 AM

## 2019-02-16 NOTE — ANESTHESIA POSTPROCEDURE EVALUATION
* No procedures listed *. Anesthesia Post Evaluation Multimodal analgesia: multimodal analgesia used between 6 hours prior to anesthesia start to PACU discharge Patient location during evaluation: PACU Patient participation: complete - patient participated Level of consciousness: awake and alert Pain management: adequate Airway patency: patent Anesthetic complications: no 
Cardiovascular status: acceptable Respiratory status: acceptable Hydration status: acceptable Post anesthesia nausea and vomiting:  controlled Visit Vitals /70 Pulse 83 Temp 36.4 °C (97.6 °F) Resp 12 SpO2 100% Breastfeeding? Unknown

## 2019-02-16 NOTE — PROGRESS NOTES
Post-Operative  Day 1 Patient: Clare Rodríguez MRN: [id number]  SSN: [id number] YOB: 1975  Age: [address] y.o. Sex: female Subjective:  
 
PostOp Day: 1 Delivery:  delivery, repeat The patient feels well. Pain is moderately controlled with current medications. Rondon in place. The patient is tolerating a clear diet. Lochia less than a period. Objective:  
  
Patient Vitals for the past 8 hrs: 
 BP Temp Pulse Resp SpO2  
19 1230 133/83  75    
19 1215 122/79  75    
19 1200 131/65  93    
19 1145 118/88  97    
19 1130 130/86  84    
19 1115 134/74  97    
19 1110 129/84  90    
19 1100 121/82  89    
19 1045 122/75  79    
19 1030 114/80 98.9 °F (37.2 °C) 80 17 100 % 19 1028 114/80  79    
19 1015 118/78  90    
19 1000 112/73 98.3 °F (36.8 °C) 99 17 100 % 19 0958 112/73  96    
19 0950 111/75  89    
19 0945 111/75 98.3 °F (36.8 °C) 93 17 100 % 19 0930 130/88 98.3 °F (36.8 °C) 93 16 100 % 19 0914 115/83 99.1 °F (37.3 °C) 93 15 100 % General:    alert, cooperative, no distress Abdomen:   soft, appropriately TTP Incision:  no significant drainage, no dehiscence, no significant erythema,  Dressing in place c/d/i Extremities:  No erythema, tenderness, edema DVT Evaluation:  No evidence of DVT seen on physical exam.  
 
Lab/Data Review: 
Recent Results (from the past 24 hour(s)) CBC WITH AUTOMATED DIFF Collection Time: 02/15/19 10:22 PM  
Result Value Ref Range WBC 15.8 (H) 4.6 - 13.2 K/uL  
 RBC 3.54 (L) 4.20 - 5.30 M/uL  
 HGB 10.1 (L) 12.0 - 16.0 g/dL HCT 29.6 (L) 35.0 - 45.0 % MCV 83.6 74.0 - 97.0 FL  
 MCH 28.5 24.0 - 34.0 PG  
 MCHC 34.1 31.0 - 37.0 g/dL  
 RDW 14.5 11.6 - 14.5 % PLATELET 490 985 - 778 K/uL MPV 11.3 9.2 - 11.8 FL  
 NEUTROPHILS 69 40 - 73 % LYMPHOCYTES 22 21 - 52 % MONOCYTES 8 3 - 10 % EOSINOPHILS 1 0 - 5 % BASOPHILS 0 0 - 2 %  
 ABS. NEUTROPHILS 10.9 (H) 1.8 - 8.0 K/UL  
 ABS. LYMPHOCYTES 3.5 0.9 - 3.6 K/UL  
 ABS. MONOCYTES 1.3 (H) 0.05 - 1.2 K/UL  
 ABS. EOSINOPHILS 0.1 0.0 - 0.4 K/UL  
 ABS. BASOPHILS 0.0 0.0 - 0.1 K/UL  
 DF AUTOMATED    
PTT Collection Time: 02/15/19 10:22 PM  
Result Value Ref Range aPTT 26.8 23.0 - 36.4 SEC FIBRINOGEN Collection Time: 02/15/19 10:22 PM  
Result Value Ref Range Fibrinogen 431 210 - 451 mg/dL D DIMER Collection Time: 02/15/19 10:22 PM  
Result Value Ref Range D DIMER 1.59 (H) <0.46 ug/ml(FEU) FDP, PLASMA Collection Time: 02/15/19 10:22 PM  
Result Value Ref Range FIBRIN DEGRAD. PROD. <5 <5 UG/ML  
TYPE + CROSSMATCH Collection Time: 02/15/19 10:22 PM  
Result Value Ref Range Crossmatch Expiration 02/18/2019 ABO/Rh(D) A POSITIVE Antibody screen POS Antibody ID ANTI-ZANDRA   
 CALLED TO: MENG [place] [id number], [address] [place] [address],  AT 83 ON [id number], BY YANY Unit number [id number] Blood component type [facility] Unit division 00 Status of unit ISSUED ANTIGEN/ANTIBODY INFO ZANDRA NEGATIVE, Crossmatch result Compatible Unit number [id number] Blood component type [facility] Unit division 00 Status of unit ALLOCATED ANTIGEN/ANTIBODY INFO ZANDRA NEGATIVE, Crossmatch result Compatible Unit number [id number] Blood component type [facility] Unit division 00 Status of unit ISSUED ANTIGEN/ANTIBODY INFO ZANDRA NEGATIVE, Crossmatch result Compatible CBC WITH AUTOMATED DIFF Collection Time: 02/16/19  1:16 AM  
Result Value Ref Range WBC 25.5 (H) 4.6 - 13.2 K/uL  
 RBC 2.87 (L) 4.20 - 5.30 M/uL HGB 8.0 (L) 12.0 - 16.0 g/dL HCT 25.0 (L) 35.0 - 45.0 % MCV 87.1 74.0 - 97.0 FL  
 MCH 27.9 24.0 - 34.0 PG  
 MCHC 32.0 31.0 - 37.0 g/dL  
 RDW 14.8 (H) 11.6 - 14.5 % PLATELET 344 805 - 469 K/uL  MPV 11.1 9.2 - 11.8 FL  
 NEUTROPHILS 67 42 - 75 % BAND NEUTROPHILS 20 (H) 0 - 5 % LYMPHOCYTES 5 (L) 20 - 51 % MONOCYTES 7 2 - 9 % EOSINOPHILS 0 0 - 5 % BASOPHILS 1 0 - 3 %  
 ABS. NEUTROPHILS 22.1 (H) 1.8 - 8.0 K/UL  
 ABS. LYMPHOCYTES 1.3 0.8 - 3.5 K/UL  
 ABS. MONOCYTES 1.8 (H) 0 - 1.0 K/UL  
 ABS. EOSINOPHILS 0.0 0.0 - 0.4 K/UL  
 ABS. BASOPHILS 0.3 (H) 0.0 - 0.06 K/UL  
 DF AUTOMATED PLATELET COMMENTS ADEQUATE PLATELETS    
 RBC COMMENTS ANISOCYTOSIS 1+ 
    
 RBC COMMENTS HYPOCHROMIA 1+ PROTHROMBIN TIME + INR Collection Time: 02/16/19  2:20 AM  
Result Value Ref Range Prothrombin time 15.1 11.5 - 15.2 sec INR 1.2 0.8 - 1.2 FIBRINOGEN Collection Time: 02/16/19  2:20 AM  
Result Value Ref Range Fibrinogen 177 (L) 210 - 451 mg/dL CBC WITH AUTOMATED DIFF Collection Time: 02/16/19  5:25 AM  
Result Value Ref Range WBC 23.9 (H) 4.6 - 13.2 K/uL  
 RBC 2.44 (L) 4.20 - 5.30 M/uL HGB 6.8 (L) 12.0 - 16.0 g/dL HCT 20.7 (L) 35.0 - 45.0 % MCV 84.8 74.0 - 97.0 FL  
 MCH 27.9 24.0 - 34.0 PG  
 MCHC 32.9 31.0 - 37.0 g/dL  
 RDW 14.7 (H) 11.6 - 14.5 % PLATELET 452 001 - 694 K/uL MPV 10.8 9.2 - 11.8 FL  
 NEUTROPHILS 74 42 - 75 % BAND NEUTROPHILS 15 (H) 0 - 5 % LYMPHOCYTES 3 (L) 20 - 51 % MONOCYTES 8 2 - 9 % EOSINOPHILS 0 0 - 5 % BASOPHILS 0 0 - 3 %  
 ABS. NEUTROPHILS 21.3 (H) 1.8 - 8.0 K/UL  
 ABS. LYMPHOCYTES 0.7 (L) 0.8 - 3.5 K/UL  
 ABS. MONOCYTES 1.9 (H) 0 - 1.0 K/UL  
 ABS. EOSINOPHILS 0.0 0.0 - 0.4 K/UL  
 ABS. BASOPHILS 0.0 0.0 - 0.06 K/UL  
 DF AUTOMATED PLATELET COMMENTS Platelet Estimate, Decreased RBC COMMENTS ANISOCYTOSIS 
1+ 
    
CBC W/O DIFF Collection Time: 02/16/19  3:18 PM  
Result Value Ref Range WBC 27.9 (H) 4.6 - 13.2 K/uL  
 RBC 2.80 (L) 4.20 - 5.30 M/uL HGB 8.3 (L) 12.0 - 16.0 g/dL HCT 23.8 (L) 35.0 - 45.0 % MCV 85.0 74.0 - 97.0 FL  
 MCH 29.6 24.0 - 34.0 PG  
 MCHC 34.9 31.0 - 37.0 g/dL  
 RDW 14.4 11.6 - 14.5 % PLATELET 858 (L) 489 - 420 K/uL MPV 11.5 9.2 - 11.8 FL Assessment:  
 
Status post: Postpartum  delivery complicated by PPH ( EBL s/p C/S 300 cc) and acute blood loss anemia 2/2 acute blood loss. Currently, HDS, VSS Patient Active Problem List  
Diagnosis Code  Pregnancy Z34.90  Placenta previa affecting delivery O44.00 Plan: 1. PPH:  improved s/p cytotec and methergine. Continue pad counts 2. Acute blood loss anemia on chronic anemia--> total estimated blood loss since admission (1.8L) S/p 2 urprbcs Post transfusion H/H: 8.3/23, continue serial CBC until am tomorrow Uop adequate-->will d/c valles 3. Thrombocytopenia: plt 107, pt currently not actively bleeding. No indication for transfusion at this time will continue to monitor Ok to transfer to postpartum Encourage ambulation with assistance Percocet prn for pain, d/c morphine. Avoid Nsaids Advance diet to regular. Baby in still in special care, but doing well-->will hold off on circumcision for now Re-discussed details of  section with patient and further plan of care. Answered all of her questions.   
 
 
 
 
 
Signed By: Wolfgang Duenas MD   
 2019 5:05 PM

## 2019-02-16 NOTE — PERIOP NOTES
Fundus checked-firm, slightly left of umbilicus. Moderate amount of bloody drainage on chux pad. Perineum cleaned and small trickle bloody drainage present, clean chux placed under patient and peripad placed in perineum.

## 2019-02-16 NOTE — PROGRESS NOTES
Post-Operative  Day 1 Patient: Octavio Murillo MRN: [id number]  SSN: [id number] YOB: 1975  Age: 41 y.o. Sex: female Subjective:  
 
PostOp Day: 0 Delivery:  delivery, repeat Called in by RN for continued  vaginal bleeding since transfer from PACU. Per RN patient soaked through 3-4 chucks. Orders given to give Cytote and methergine around 230a. Her bleeding improved a little but she continued to have moderate vaginal bleeding. Objective:  
  
Patient Vitals for the past 8 hrs: 
 BP Temp Pulse Resp SpO2  
19 0445 116/72  (!) 105    
19 0415 115/64  (!) 127    
19 0345 123/73  98    
19 0330 112/76  95    
19 0320 93/75  (!) 107    
19 0157 114/73  95    
19 0138   83 12 100 % 19 0135 130/70  89 12 100 % 19 0134   86 12 100 % 19 0125 114/80  88 13 100 % 19 0116   82 14 100 % 19 0115 126/76  81 13 100 % 19 0113   89 15 100 % 19 0105 133/83  85 15 100 % 19 0102 (!) 123/2 97.6 °F (36.4 °C) 90 16 100 % 19 0058   94 15 100 % 19 0057     100 % 19 0056     100 % 19 0055 123/72 97.6 °F (36.4 °C) 94 15 100 % General:    alert, no distress, pale Abdomen:   soft, appropriately TTP Incision:  no significant drainage, no dehiscence, no significant erythema Extremities:  No erythema, tenderness, edema DVT Evaluation:  No evidence of DVT seen on physical exam.  
Speculum Exam: 100cc of blood and clots evacuated from the vaginal vault. Minimal bleeding noted coming from the cervix. Lab/Data Review: 
Recent Results (from the past 24 hour(s)) CBC WITH AUTOMATED DIFF Collection Time: 02/15/19 10:22 PM  
Result Value Ref Range WBC 15.8 (H) 4.6 - 13.2 K/uL  
 RBC 3.54 (L) 4.20 - 5.30 M/uL  
 HGB 10.1 (L) 12.0 - 16.0 g/dL HCT 29.6 (L) 35.0 - 45.0 %  MCV 83.6 74.0 - 97.0 FL  
 MCH 28.5 24.0 - 34.0 PG  
 MCHC 34.1 31.0 - 37.0 g/dL  
 RDW 14.5 11.6 - 14.5 % PLATELET 563 874 - 242 K/uL MPV 11.3 9.2 - 11.8 FL  
 NEUTROPHILS 69 40 - 73 % LYMPHOCYTES 22 21 - 52 % MONOCYTES 8 3 - 10 % EOSINOPHILS 1 0 - 5 % BASOPHILS 0 0 - 2 %  
 ABS. NEUTROPHILS 10.9 (H) 1.8 - 8.0 K/UL  
 ABS. LYMPHOCYTES 3.5 0.9 - 3.6 K/UL  
 ABS. MONOCYTES 1.3 (H) 0.05 - 1.2 K/UL  
 ABS. EOSINOPHILS 0.1 0.0 - 0.4 K/UL  
 ABS. BASOPHILS 0.0 0.0 - 0.1 K/UL  
 DF AUTOMATED    
PTT Collection Time: 02/15/19 10:22 PM  
Result Value Ref Range aPTT 26.8 23.0 - 36.4 SEC FIBRINOGEN Collection Time: 02/15/19 10:22 PM  
Result Value Ref Range Fibrinogen 431 210 - 451 mg/dL D DIMER Collection Time: 02/15/19 10:22 PM  
Result Value Ref Range D DIMER 1.59 (H) <0.46 ug/ml(FEU) FDP, PLASMA Collection Time: 02/15/19 10:22 PM  
Result Value Ref Range FIBRIN DEGRAD. PROD. <5 <5 UG/ML  
TYPE + CROSSMATCH Collection Time: 02/15/19 10:22 PM  
Result Value Ref Range Crossmatch Expiration 02/18/2019 ABO/Rh(D) A POSITIVE Antibody screen POS   
CBC WITH AUTOMATED DIFF Collection Time: 02/16/19  1:16 AM  
Result Value Ref Range WBC 25.5 (H) 4.6 - 13.2 K/uL  
 RBC 2.87 (L) 4.20 - 5.30 M/uL HGB 8.0 (L) 12.0 - 16.0 g/dL HCT 25.0 (L) 35.0 - 45.0 % MCV 87.1 74.0 - 97.0 FL  
 MCH 27.9 24.0 - 34.0 PG  
 MCHC 32.0 31.0 - 37.0 g/dL  
 RDW 14.8 (H) 11.6 - 14.5 % PLATELET 595 027 - 819 K/uL MPV 11.1 9.2 - 11.8 FL  
 NEUTROPHILS 67 42 - 75 % BAND NEUTROPHILS 20 (H) 0 - 5 % LYMPHOCYTES 5 (L) 20 - 51 % MONOCYTES 7 2 - 9 % EOSINOPHILS 0 0 - 5 % BASOPHILS 1 0 - 3 %  
 ABS. NEUTROPHILS 22.1 (H) 1.8 - 8.0 K/UL  
 ABS. LYMPHOCYTES 1.3 0.8 - 3.5 K/UL  
 ABS. MONOCYTES 1.8 (H) 0 - 1.0 K/UL  
 ABS. EOSINOPHILS 0.0 0.0 - 0.4 K/UL  
 ABS. BASOPHILS 0.3 (H) 0.0 - 0.06 K/UL  
 DF AUTOMATED  PLATELET COMMENTS ADEQUATE PLATELETS    
 RBC COMMENTS ANISOCYTOSIS 
1+ 
    
 RBC COMMENTS HYPOCHROMIA 1+ PROTHROMBIN TIME + INR Collection Time: 19  2:20 AM  
Result Value Ref Range Prothrombin time 15.1 11.5 - 15.2 sec INR 1.2 0.8 - 1.2 FIBRINOGEN Collection Time: 19  2:20 AM  
Result Value Ref Range Fibrinogen 177 (L) 210 - 451 mg/dL Assessment:  
 
Status post: Postpartum  delivery complicated by PPH ( EBL s/p C/S 300 cc) and acute blood loss anemia 2/2 acute blood loss. Patient Active Problem List  
Diagnosis Code  Pregnancy Z34.90  Placenta previa affecting delivery O44.00 Plan: 1. PPH: bleeding appears to have improved s/p cytotec and methergine. Will continue to monitor. Continue pad counts 2. Acute blood loss anemia on chronic anemia--> total estimated blood loss (1.8L) pt currently tachycardic 120s, BP wnl. Post op H/H @1a  from 10/29.6, given the amount blood loss,  Will go ahead and transfuse 2units prbcs. Will repeat CBC now. INR 1.2. Fibrinogen 177 Continue IVF Strict I&0s Per Blood Bank will take time getting blood because antibody screen positive. 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 Will continue current management as bleeding appears to have improved.   
 
 
Signed By: Shahriar Mcmanus MD   
 2019 4:59 AM

## 2019-02-16 NOTE — PERIOP NOTES
TRANSFER - OUT REPORT: 
 
Verbal report given to Jackelyn Hernandez RN on [employer]  being transferred to L&D for routine post - op Report consisted of patients Situation, Background, Assessment and  
Recommendations(SBAR). Information from the following report(s) SBAR and MAR was reviewed with the receiving nurse. Lines:  
Peripheral IV 02/15/19 Left Hand (Active) Site Assessment Clean, dry, & intact 2/16/2019 12:55 AM  
Phlebitis Assessment 0 2/16/2019 12:55 AM  
Infiltration Assessment 0 2/16/2019 12:55 AM  
Dressing Status Clean, dry, & intact 2/16/2019 12:55 AM  
Dressing Type Tape;Transparent 2/16/2019 12:55 AM  
Hub Color/Line Status Pink; Infusing 2/16/2019 12:55 AM  
  
 
Opportunity for questions and clarification was provided. Patient transported with: 
 O2 @ 2 liters

## 2019-02-16 NOTE — PROGRESS NOTES
1625: Received to 2207 via W/C from Labor and Delivery. 1630: TRANSFER - IN REPORT: 
 
Verbal report received from MICHELL Jeter(name) on Mable BALDERAS Waiters  being received from Labor and Delivery(unit) for routine progression of care Report consisted of patients Situation, Background, Assessment and  
Recommendations(SBAR). Information from the following report(s) SBAR was reviewed with the receiving nurse. Opportunity for questions and clarification was provided. Assessment completed upon patients arrival to unit and care assumed. 1640: To Special Care Nursery to see baby. 1742: Back to room. Partial assessment completed. Patient wants to eat dinner. Pain level acceptable 2/10. 
 
1811: Initial pp assessment and teaching completed Oriented patient to room, policies, call bell, TV, phone, bed controls, emergency call light in bathroom, expected plan and progression of care, availability of ordered pain meds. Booklets and gift packs given. VIS for Tdap given for patient to review. She declines flu vaccine Instructed on use of ice/tucks/spray for pericare. Instructed on use of splinting pillow and ICS. Advised to call for assistance of staff OOB. 1910: Verbal and bedside report given to oncoming RN,JOSE Beaver, using SBAR, Kardex, and MAR.

## 2019-02-16 NOTE — PROGRESS NOTES
Problem: Falls - Risk of 
Goal: *Absence of Falls Document Leeanne End Fall Risk and appropriate interventions in the flowsheet. Outcome: Progressing Towards Goal 
Fall Risk Interventions: 
  
 
  
 
Medication Interventions: Teach patient to arise slowly, Patient to call before getting OOB Elimination Interventions: Patient to call for help with toileting needs

## 2019-02-16 NOTE — PROGRESS NOTES
Pt extubated in transferred to PACU in stable. Will get serial CBC 
coags ordered Clear diet/IVF Rondon in place. Strict I&Os Ancef x24hrs Pt to stay on L&D overnight for close monitoring Briefly discussed  section with patient's family. Answered all of their questions

## 2019-02-17 LAB
ANION GAP SERPL CALC-SCNC: 4 MMOL/L (ref 3–18)
BASOPHILS # BLD: 0 K/UL (ref 0–0.06)
BASOPHILS NFR BLD: 0 % (ref 0–3)
BUN SERPL-MCNC: 6 MG/DL (ref 7–18)
BUN/CREAT SERPL: 12 (ref 12–20)
CALCIUM SERPL-MCNC: 7.6 MG/DL (ref 8.5–10.1)
CHLORIDE SERPL-SCNC: 109 MMOL/L (ref 100–108)
CO2 SERPL-SCNC: 26 MMOL/L (ref 21–32)
CREAT SERPL-MCNC: 0.49 MG/DL (ref 0.6–1.3)
DIFFERENTIAL METHOD BLD: ABNORMAL
EOSINOPHIL # BLD: 0 K/UL (ref 0–0.4)
EOSINOPHIL NFR BLD: 0 % (ref 0–5)
ERYTHROCYTE [DISTWIDTH] IN BLOOD BY AUTOMATED COUNT: 14.6 % (ref 11.6–14.5)
GLUCOSE SERPL-MCNC: 71 MG/DL (ref 74–99)
HCT VFR BLD AUTO: 20.6 % (ref 35–45)
HGB BLD-MCNC: 7 G/DL (ref 12–16)
LYMPHOCYTES # BLD: 2 K/UL (ref 0.8–3.5)
LYMPHOCYTES NFR BLD: 9 % (ref 20–51)
MCH RBC QN AUTO: 28.9 PG (ref 24–34)
MCHC RBC AUTO-ENTMCNC: 34 G/DL (ref 31–37)
MCV RBC AUTO: 85.1 FL (ref 74–97)
MONOCYTES # BLD: 2 K/UL (ref 0–1)
MONOCYTES NFR BLD: 9 % (ref 2–9)
NEUTS BAND NFR BLD MANUAL: 7 % (ref 0–5)
NEUTS SEG # BLD: 18 K/UL (ref 1.8–8)
NEUTS SEG NFR BLD: 75 % (ref 42–75)
NRBC BLD-RTO: 1 PER 100 WBC
PLATELET # BLD AUTO: 124 K/UL (ref 135–420)
PLATELET COMMENTS,PCOM: ABNORMAL
PMV BLD AUTO: 11.1 FL (ref 9.2–11.8)
POTASSIUM SERPL-SCNC: 4.1 MMOL/L (ref 3.5–5.5)
RBC # BLD AUTO: 2.42 M/UL (ref 4.2–5.3)
RBC MORPH BLD: ABNORMAL
SODIUM SERPL-SCNC: 139 MMOL/L (ref 136–145)
WBC # BLD AUTO: 22 K/UL (ref 4.6–13.2)

## 2019-02-17 PROCEDURE — 36415 COLL VENOUS BLD VENIPUNCTURE: CPT

## 2019-02-17 PROCEDURE — 80048 BASIC METABOLIC PNL TOTAL CA: CPT

## 2019-02-17 PROCEDURE — 74011250637 HC RX REV CODE- 250/637: Performed by: OBSTETRICS & GYNECOLOGY

## 2019-02-17 PROCEDURE — 65270000029 HC RM PRIVATE

## 2019-02-17 PROCEDURE — 85025 COMPLETE CBC W/AUTO DIFF WBC: CPT

## 2019-02-17 RX ORDER — LANOLIN ALCOHOL/MO/W.PET/CERES
1 CREAM (GRAM) TOPICAL
Status: DISCONTINUED | OUTPATIENT
Start: 2019-02-17 | End: 2019-02-19 | Stop reason: HOSPADM

## 2019-02-17 RX ORDER — ASCORBIC ACID 250 MG
500 TABLET ORAL DAILY
Status: DISCONTINUED | OUTPATIENT
Start: 2019-02-17 | End: 2019-02-19 | Stop reason: HOSPADM

## 2019-02-17 RX ORDER — DOCUSATE SODIUM 100 MG/1
100 CAPSULE, LIQUID FILLED ORAL 2 TIMES DAILY
Status: DISCONTINUED | OUTPATIENT
Start: 2019-02-17 | End: 2019-02-19 | Stop reason: HOSPADM

## 2019-02-17 RX ADMIN — DOCUSATE SODIUM 100 MG: 100 CAPSULE, LIQUID FILLED ORAL at 17:14

## 2019-02-17 RX ADMIN — OXYCODONE AND ACETAMINOPHEN 2 TABLET: 5; 325 TABLET ORAL at 13:02

## 2019-02-17 RX ADMIN — OXYCODONE AND ACETAMINOPHEN 2 TABLET: 5; 325 TABLET ORAL at 04:26

## 2019-02-17 RX ADMIN — OXYCODONE AND ACETAMINOPHEN 1 TABLET: 5; 325 TABLET ORAL at 17:14

## 2019-02-17 RX ADMIN — OXYCODONE AND ACETAMINOPHEN 2 TABLET: 5; 325 TABLET ORAL at 08:39

## 2019-02-17 RX ADMIN — FERROUS SULFATE TAB 325 MG (65 MG ELEMENTAL FE) 325 MG: 325 (65 FE) TAB at 14:14

## 2019-02-17 RX ADMIN — DOCUSATE SODIUM 100 MG: 100 CAPSULE, LIQUID FILLED ORAL at 14:14

## 2019-02-17 RX ADMIN — SIMETHICONE CHEW TAB 80 MG 80 MG: 80 TABLET ORAL at 20:26

## 2019-02-17 RX ADMIN — Medication 10 ML: at 21:54

## 2019-02-17 RX ADMIN — SIMETHICONE CHEW TAB 80 MG 80 MG: 80 TABLET ORAL at 12:21

## 2019-02-17 RX ADMIN — Medication 500 MG: at 14:14

## 2019-02-17 RX ADMIN — OXYCODONE AND ACETAMINOPHEN 1 TABLET: 5; 325 TABLET ORAL at 21:53

## 2019-02-17 RX ADMIN — Medication 10 MG: at 18:07

## 2019-02-17 RX ADMIN — FERROUS SULFATE TAB 325 MG (65 MG ELEMENTAL FE) 325 MG: 325 (65 FE) TAB at 17:14

## 2019-02-17 NOTE — PROGRESS NOTES
1903-Verbal and bedside report received from offgoing RNMITALI , using SBAR, Kardex, and MAR. 
 
8878- Verbal and bedside report given to oncoming RNMITALI, using SBAR, Kardex, and MAR.

## 2019-02-17 NOTE — PROGRESS NOTES
0- Dr. Venus pak L&D and gave me a verbal order to DC CBC order in system.  Verbal order to order CBC with differential and BMP for 0800 2/17/19/

## 2019-02-17 NOTE — PROGRESS NOTES
1915: 12: Verbal and bedside report received from offgoing RN, Bull Padilla, using SBAR, Kardex, and MAR. Please see Patient Observation flowsheet. 1226: TC to Dr. Theresa Johnson per patient's request for laxative. Message left on voicemail. 1231: Call back from Dr. Theresa Johnson, she says she will place order. 1915: Verbal and bedside report given to oncoming RN,MENG Bull, using SBAR, Kardex, and MAR.

## 2019-02-18 LAB
BASOPHILS # BLD: 0 K/UL (ref 0–0.06)
BASOPHILS NFR BLD: 0 % (ref 0–3)
DIFFERENTIAL METHOD BLD: ABNORMAL
EOSINOPHIL # BLD: 0 K/UL (ref 0–0.4)
EOSINOPHIL NFR BLD: 0 % (ref 0–5)
ERYTHROCYTE [DISTWIDTH] IN BLOOD BY AUTOMATED COUNT: 15 % (ref 11.6–14.5)
HCT VFR BLD AUTO: 19.3 % (ref 35–45)
HGB BLD-MCNC: 6.6 G/DL (ref 12–16)
LYMPHOCYTES # BLD: 3 K/UL (ref 0.8–3.5)
LYMPHOCYTES NFR BLD: 15 % (ref 20–51)
MCH RBC QN AUTO: 29.3 PG (ref 24–34)
MCHC RBC AUTO-ENTMCNC: 34.2 G/DL (ref 31–37)
MCV RBC AUTO: 85.8 FL (ref 74–97)
MONOCYTES # BLD: 1.2 K/UL (ref 0–1)
MONOCYTES NFR BLD: 6 % (ref 2–9)
NEUTS BAND NFR BLD MANUAL: 5 % (ref 0–5)
NEUTS SEG # BLD: 15.7 K/UL (ref 1.8–8)
NEUTS SEG NFR BLD: 74 % (ref 42–75)
PLATELET # BLD AUTO: 144 K/UL (ref 135–420)
PLATELET COMMENTS,PCOM: ABNORMAL
PMV BLD AUTO: 10.3 FL (ref 9.2–11.8)
RBC # BLD AUTO: 2.25 M/UL (ref 4.2–5.3)
RBC MORPH BLD: ABNORMAL
WBC # BLD AUTO: 19.9 K/UL (ref 4.6–13.2)

## 2019-02-18 PROCEDURE — 85025 COMPLETE CBC W/AUTO DIFF WBC: CPT

## 2019-02-18 PROCEDURE — P9016 RBC LEUKOCYTES REDUCED: HCPCS

## 2019-02-18 PROCEDURE — 36430 TRANSFUSION BLD/BLD COMPNT: CPT

## 2019-02-18 PROCEDURE — 86922 COMPATIBILITY TEST ANTIGLOB: CPT

## 2019-02-18 PROCEDURE — 65270000029 HC RM PRIVATE

## 2019-02-18 PROCEDURE — 36415 COLL VENOUS BLD VENIPUNCTURE: CPT

## 2019-02-18 PROCEDURE — 74011250637 HC RX REV CODE- 250/637: Performed by: OBSTETRICS & GYNECOLOGY

## 2019-02-18 PROCEDURE — 86902 BLOOD TYPE ANTIGEN DONOR EA: CPT

## 2019-02-18 PROCEDURE — 86921 COMPATIBILITY TEST INCUBATE: CPT

## 2019-02-18 RX ORDER — SODIUM CHLORIDE 9 MG/ML
250 INJECTION, SOLUTION INTRAVENOUS AS NEEDED
Status: DISCONTINUED | OUTPATIENT
Start: 2019-02-18 | End: 2019-02-19 | Stop reason: SDUPTHER

## 2019-02-18 RX ORDER — SODIUM CHLORIDE 9 MG/ML
250 INJECTION, SOLUTION INTRAVENOUS AS NEEDED
Status: DISCONTINUED | OUTPATIENT
Start: 2019-02-18 | End: 2019-02-19 | Stop reason: HOSPADM

## 2019-02-18 RX ADMIN — FERROUS SULFATE TAB 325 MG (65 MG ELEMENTAL FE) 325 MG: 325 (65 FE) TAB at 17:03

## 2019-02-18 RX ADMIN — FERROUS SULFATE TAB 325 MG (65 MG ELEMENTAL FE) 325 MG: 325 (65 FE) TAB at 08:41

## 2019-02-18 RX ADMIN — SIMETHICONE CHEW TAB 80 MG 80 MG: 80 TABLET ORAL at 03:16

## 2019-02-18 RX ADMIN — OXYCODONE AND ACETAMINOPHEN 2 TABLET: 5; 325 TABLET ORAL at 11:31

## 2019-02-18 RX ADMIN — DOCUSATE SODIUM 100 MG: 100 CAPSULE, LIQUID FILLED ORAL at 08:41

## 2019-02-18 RX ADMIN — DOCUSATE SODIUM 100 MG: 100 CAPSULE, LIQUID FILLED ORAL at 17:03

## 2019-02-18 RX ADMIN — FERROUS SULFATE TAB 325 MG (65 MG ELEMENTAL FE) 325 MG: 325 (65 FE) TAB at 11:31

## 2019-02-18 RX ADMIN — OXYCODONE AND ACETAMINOPHEN 2 TABLET: 5; 325 TABLET ORAL at 23:40

## 2019-02-18 RX ADMIN — Medication 10 ML: at 04:28

## 2019-02-18 RX ADMIN — OXYCODONE AND ACETAMINOPHEN 1 TABLET: 5; 325 TABLET ORAL at 03:14

## 2019-02-18 RX ADMIN — Medication 500 MG: at 08:41

## 2019-02-18 NOTE — PROGRESS NOTES
Post-Operative  Day 3 Patient: Kecia Vu MRN: [id number]  SSN: [id number] YOB: 1975  Age: 41 y.o. Sex: female Subjective:  
 
PostOp Day: 3 Delivery:  delivery The patient feels well. Pain is  well controlled with current medications. The baby is well in the SCU. Baby is feeding via both breast and bottle . Voiding spontaneous. The patient is ambulating well. The patient is tolerating a normal diet. Lochia like a period. Objective:  
  
Patient Vitals for the past 8 hrs: 
 BP Temp Pulse Resp SpO2  
19 0944 124/82 98.6 °F (37 °C) 93 16 100 % 19 0800 122/77 98.5 °F (36.9 °C) 98 14 100 % General:    alert, cooperative, no distress Abdomen:   soft, appropriately TTP Incision:  no significant drainage, no dehiscence, no significant erythema Extremities:  No erythema, tenderness, edema DVT Evaluation:  No evidence of DVT seen on physical exam.  
 
Lab/Data Review: 
Recent Results (from the past 24 hour(s)) CBC WITH AUTOMATED DIFF Collection Time: 19  6:05 AM  
Result Value Ref Range WBC 19.9 (H) 4.6 - 13.2 K/uL  
 RBC 2.25 (L) 4.20 - 5.30 M/uL HGB 6.6 (L) 12.0 - 16.0 g/dL HCT 19.3 (L) 35.0 - 45.0 % MCV 85.8 74.0 - 97.0 FL  
 MCH 29.3 24.0 - 34.0 PG  
 MCHC 34.2 31.0 - 37.0 g/dL  
 RDW 15.0 (H) 11.6 - 14.5 % PLATELET 610 727 - 444 K/uL MPV 10.3 9.2 - 11.8 FL  
 NEUTROPHILS 74 42 - 75 % BAND NEUTROPHILS 5 0 - 5 % LYMPHOCYTES 15 (L) 20 - 51 % MONOCYTES 6 2 - 9 % EOSINOPHILS 0 0 - 5 % BASOPHILS 0 0 - 3 %  
 ABS. NEUTROPHILS 15.7 (H) 1.8 - 8.0 K/UL  
 ABS. LYMPHOCYTES 3.0 0.8 - 3.5 K/UL  
 ABS. MONOCYTES 1.2 (H) 0 - 1.0 K/UL  
 ABS. EOSINOPHILS 0.0 0.0 - 0.4 K/UL  
 ABS. BASOPHILS 0.0 0.0 - 0.06 K/UL  
 DF MANUAL PLATELET COMMENTS ADEQUATE PLATELETS    
 RBC COMMENTS ANISOCYTOSIS 1+ 
    
 RBC COMMENTS POLYCHROMASIA 1+ 
    
 RBC COMMENTS HYPOCHROMIA 1+ Assessment: Status post: Postpartum  delivery complicated by PPH and acute blood loss anemia 2/2 acute blood loss s/p 2 units PRBCs Patient Active Problem List  
Diagnosis Code  Pregnancy Z34.90  Placenta previa affecting delivery O44.00 Plan: 1. Doing well, continue routine postpartum care. 2. Acute blood loss anemia on chronic anemia--> Has anti-[place] and anti-E antibodies, lab thinks she may have hemolyzed due to Anti-E antibodies, will crossmatch for both, planning to transfuse 1 unit more 3. Thrombocytopenia:improving. No indication for transfusion at this time will continue resolved 4. Leukocytosis: improving 5. Baby in special care. Will do circumcision prior to discharge if cleared by pediatrician 6. Depression--> Dx'ed in 2018, was on prozac and seroquel, planning on getting a counselor, declines prozac at this time, no SI or HI ideation Signed By: Tray Chavis MD   
 2019 3:35 PM

## 2019-02-18 NOTE — ROUTINE PROCESS
Bedside and Verbal shift change report given to Corinne Market, RN (oncoming nurse) by Darell Fuchs RN (offgoing nurse). Report included the following information OR Summary, MAR and Recent Results. 0730 - Darell Fuchs RN assumes care of pt

## 2019-02-18 NOTE — PROGRESS NOTES
Problem: Falls - Risk of 
Goal: *Absence of Falls Document Eldon Dust Fall Risk and appropriate interventions in the flowsheet. Outcome: Progressing Towards Goal 
Fall Risk Interventions: 
  
 
  
 
Medication Interventions: Teach patient to arise slowly Elimination Interventions: Call light in reach, Elevated toilet seat

## 2019-02-18 NOTE — PROGRESS NOTES
4924: Verbal and bedside report received from offgoing RN, Sharon Jordan, using SBAR, Kardex, and MAR. Please see Patient Observation flowsheet for shift activities. 0815: TC to Dr. Cohen Call to report H/H results this AM. 1 unit PRBC's ordered. 1015: Janki Peaks I was running NS though IV site right antecubital, and patient c/o pain at IV site. IV site discontinued, bandaid applied. Blood was not started. 1115: Unsuccessful IV attempts x 3, I spoke to anesthesia, say they will come try. Blood unit returned to blood bank. 1432: I placed another call to anesthesia, says they will be her to start IV. 
 
1445: Unsuccessful IV attempt my anesthesia. Called Dr. Cohen Call. She wants Labor and Delivery RN to see if they can start IV, if not, order a PICC line. 1614: Notified Trini Velasco about Case Management consult. 1910: Verbal and bedside report given to oncoming RN MOLINA Andujar, using SBAR, Kardex, and MAR.

## 2019-02-18 NOTE — PROGRESS NOTES
Problem: Falls - Risk of 
Goal: *Absence of Falls Document Chari Sims Fall Risk and appropriate interventions in the flowsheet. Outcome: Progressing Towards Goal 
Fall Risk Interventions: 
  
 
  
 
Medication Interventions: Teach patient to arise slowly Elimination Interventions: Call light in reach, Elevated toilet seat

## 2019-02-19 VITALS
DIASTOLIC BLOOD PRESSURE: 80 MMHG | RESPIRATION RATE: 18 BRPM | OXYGEN SATURATION: 100 % | TEMPERATURE: 98.5 F | HEIGHT: 63 IN | WEIGHT: 144.5 LBS | SYSTOLIC BLOOD PRESSURE: 122 MMHG | BODY MASS INDEX: 25.6 KG/M2 | HEART RATE: 82 BPM

## 2019-02-19 LAB
ABO + RH BLD: NORMAL
ANTIGENS PRESENT RBC DONR: NORMAL
BASOPHILS # BLD: 0 K/UL (ref 0–0.1)
BASOPHILS NFR BLD: 0 % (ref 0–2)
BLD PROD TYP BPU: NORMAL
BLOOD GROUP ANTIBODIES SERPL: NORMAL
BLOOD GROUP ANTIBODIES SERPL: NORMAL
BPU ID: NORMAL
CALLED TO:,BCALL1: NORMAL
CALLED TO:,BCALL2: NORMAL
CROSSMATCH RESULT,%XM: NORMAL
DIFFERENTIAL METHOD BLD: ABNORMAL
EOSINOPHIL # BLD: 0.1 K/UL (ref 0–0.4)
EOSINOPHIL NFR BLD: 1 % (ref 0–5)
ERYTHROCYTE [DISTWIDTH] IN BLOOD BY AUTOMATED COUNT: 14.8 % (ref 11.6–14.5)
HCT VFR BLD AUTO: 23.1 % (ref 35–45)
HGB BLD-MCNC: 7.9 G/DL (ref 12–16)
LYMPHOCYTES # BLD: 2.6 K/UL (ref 0.9–3.6)
LYMPHOCYTES NFR BLD: 19 % (ref 21–52)
MCH RBC QN AUTO: 30 PG (ref 24–34)
MCHC RBC AUTO-ENTMCNC: 34.2 G/DL (ref 31–37)
MCV RBC AUTO: 87.8 FL (ref 74–97)
MONOCYTES # BLD: 0.8 K/UL (ref 0.05–1.2)
MONOCYTES NFR BLD: 6 % (ref 3–10)
NEUTS SEG # BLD: 10.3 K/UL (ref 1.8–8)
NEUTS SEG NFR BLD: 74 % (ref 40–73)
PLATELET # BLD AUTO: 165 K/UL (ref 135–420)
PMV BLD AUTO: 9.7 FL (ref 9.2–11.8)
RBC # BLD AUTO: 2.63 M/UL (ref 4.2–5.3)
SPECIMEN EXP DATE BLD: NORMAL
STATUS OF UNIT,%ST: NORMAL
UNIT DIVISION, %UDIV: 0
WBC # BLD AUTO: 13.8 K/UL (ref 4.6–13.2)

## 2019-02-19 PROCEDURE — 74011250637 HC RX REV CODE- 250/637: Performed by: OBSTETRICS & GYNECOLOGY

## 2019-02-19 PROCEDURE — 90715 TDAP VACCINE 7 YRS/> IM: CPT | Performed by: OBSTETRICS & GYNECOLOGY

## 2019-02-19 PROCEDURE — 85025 COMPLETE CBC W/AUTO DIFF WBC: CPT

## 2019-02-19 PROCEDURE — 74011250636 HC RX REV CODE- 250/636: Performed by: OBSTETRICS & GYNECOLOGY

## 2019-02-19 PROCEDURE — 36415 COLL VENOUS BLD VENIPUNCTURE: CPT

## 2019-02-19 RX ORDER — LANOLIN ALCOHOL/MO/W.PET/CERES
325 CREAM (GRAM) TOPICAL 2 TIMES DAILY
Qty: 100 TAB | Refills: 1 | Status: SHIPPED | OUTPATIENT
Start: 2019-02-19

## 2019-02-19 RX ORDER — OXYCODONE AND ACETAMINOPHEN 5; 325 MG/1; MG/1
1-2 TABLET ORAL
Qty: 25 TAB | Refills: 0 | Status: SHIPPED | OUTPATIENT
Start: 2019-02-19

## 2019-02-19 RX ORDER — ASCORBIC ACID 500 MG
250 TABLET ORAL 2 TIMES DAILY
Qty: 100 TAB | Refills: 1 | Status: SHIPPED | OUTPATIENT
Start: 2019-02-19

## 2019-02-19 RX ADMIN — FERROUS SULFATE TAB 325 MG (65 MG ELEMENTAL FE) 325 MG: 325 (65 FE) TAB at 08:41

## 2019-02-19 RX ADMIN — DOCUSATE SODIUM 100 MG: 100 CAPSULE, LIQUID FILLED ORAL at 17:20

## 2019-02-19 RX ADMIN — SIMETHICONE CHEW TAB 80 MG 80 MG: 80 TABLET ORAL at 08:45

## 2019-02-19 RX ADMIN — OXYCODONE AND ACETAMINOPHEN 1 TABLET: 5; 325 TABLET ORAL at 07:26

## 2019-02-19 RX ADMIN — TETANUS TOXOID, REDUCED DIPHTHERIA TOXOID AND ACELLULAR PERTUSSIS VACCINE, ADSORBED 0.5 ML: 5; 2.5; 8; 8; 2.5 SUSPENSION INTRAMUSCULAR at 15:08

## 2019-02-19 RX ADMIN — FERROUS SULFATE TAB 325 MG (65 MG ELEMENTAL FE) 325 MG: 325 (65 FE) TAB at 12:09

## 2019-02-19 RX ADMIN — FERROUS SULFATE TAB 325 MG (65 MG ELEMENTAL FE) 325 MG: 325 (65 FE) TAB at 17:20

## 2019-02-19 RX ADMIN — Medication 500 MG: at 08:41

## 2019-02-19 RX ADMIN — DOCUSATE SODIUM 100 MG: 100 CAPSULE, LIQUID FILLED ORAL at 08:41

## 2019-02-19 NOTE — DISCHARGE INSTRUCTIONS
Discharge Instructions:     Signs of Illness:  CALL YOUR PROVIDER IF ANY OF THE FOLLOWING OCCUR  1. Temperature of 100.4 or above  2. Chills  3. Severe abdominal pain  4. Excessive vaginal bleeding (more than 1 pad/hour, or any bleeding loike a period over the   5. Large amount of clots  6. Unusual discharge or drainage  7. Discharge with foul odor  8. Pain or burning on urination  9. Painful, reddened, tender breasts  10: Pain/tenderness/redness in the calf of your legs. **REFER TO PAGE 47 OF THE \"MOTHER AND BABY CARE\" BOOKLET FOR A DETAILED LIST OF SYMPTOMS TO LET YOUR DOCTOR KNOW ABOUT! Call your doctor if you have any of these symptoms, or if you have and questions or concerns after your discharge from the hospital.    Activity  1. Rest when your baby rests  2. 1-2 weeks after delivery, gradually increase your activity  3. Don't lift anything heavier than your baby  4. Limit stair climbing  5. No driving for two weeks  6. Observe your incision for increased redness, swelling, pain/tenderess, or oozing/drainage. General Concerns  1. Eat healthy, proper nutrition and adequate fluids are essential for healing, strength and energy. 2. Continue monthly self breast exams  3. No douching, tampons or intercourse for 6 weeks  4. No tub baths, pools, or hot tubs for 6 weeks      Follow-up  Call you provider on the day of discharge to schedule a follow-up appointment in 2 weeks. Call [phone] if you have any questions, and ask to speak with a nurse. DISCHARGE SUMMARY from Nurse    The following personal items collected during your admission are returned to you:   Dental Appliance:    Vision:    Hearing Aid:    Jewelry:    Clothing:    Other Valuables:    Valuables sent to safe:              *  Please give a list of your current medications to your Primary Care Provider.     *  Please update this list whenever your medications are discontinued, doses are      changed, or new medications (including over-the-counter products) are added. *  Please carry medication information at all times in case of emergency situations. These are general instructions for a healthy lifestyle:    No smoking/ No tobacco products/ Avoid exposure to second hand smoke    Surgeon General's Warning:  Quitting smoking now greatly reduces serious risk to your health. Obesity, smoking, and sedentary lifestyle greatly increases your risk for illness    A healthy diet, regular physical exercise & weight monitoring are important for maintaining a healthy lifestyle    You may be retaining fluid if you have a history of heart failure or if you experience any of the following symptoms:  Weight gain of 3 pounds or more overnight or 5 pounds in a week, increased swelling in our hands or feet or shortness of breath while lying flat in bed. Please call your doctor as soon as you notice any of these symptoms; do not wait until your next office visit. Recognize signs and symptoms of STROKE:    F-face looks uneven    A-arms unable to move or move unevenly    S-speech slurred or non-existent    T-time-call 911 as soon as signs and symptoms begin-DO NOT go       Back to bed or wait to see if you get better-TIME IS BRAIN. The discharge information has been reviewed with the patient. The patient verbalized understanding. Patient armband removed and shredded    MyChart Activation    Thank you for requesting access to [employer]. Please follow the instructions below to securely access and download your online medical record. [employer] allows you to send messages to your doctor, view your test results, renew your prescriptions, schedule appointments, and more. How Do I Sign Up? 1. In your internet browser, go to [email or web address]. 2. Click on the First Time User? Click Here link in the Sign In box. You will see the New Member Sign Up page.   3. Enter your [employer] Access Code exactly as it appears below. You will not need to use this code after youve completed the sign-up process. If you do not sign up before the expiration date, you must request a new code. [employer] Access Code: [email or web address]  Expires: 3/30/2019 12:21 PM (This is the date your [employer] access code will )    4. Enter the last four digits of your Social Security Number (xxxx) and Date of Birth (mm/dd/yyyy) as indicated and click Submit. You will be taken to the next sign-up page. 5. Create a [employer] ID. This will be your [employer] login ID and cannot be changed, so think of one that is secure and easy to remember. 6. Create a [employer] password. You can change your password at any time. 7. Enter your Password Reset Question and Answer. This can be used at a later time if you forget your password. 8. Enter your e-mail address. You will receive e-mail notification when new information is available in [address]. 9. Click Sign Up. You can now view and download portions of your medical record. 10. Click the Download Summary menu link to download a portable copy of your medical information. Additional Information    If you have questions, please visit the Frequently Asked Questions section of the [employer] website at [email or web address] Remember, [employer] is NOT to be used for urgent needs. For medical emergencies, dial 911. After Your Delivery (the Postpartum Period): After Your Visit  Your Care Instructions  Congratulations on the birth of your baby. Like pregnancy, the  period can be a time of excitement, lizbeth, and exhaustion. You may look at your wondrous little baby and feel happy. You may also be overwhelmed by your new sleep hours and new responsibilities. At first, babies often sleep during the days and are awake at night. They do not have a pattern or routine. They may make sudden gasps, jerk themselves awake, or look like they have crossed eyes.  These are all normal, and they may even make you smile. In these first weeks after delivery, try to take good care of yourself. It may take 4 to 6 weeks to feel like yourself again, and possibly longer if you had a  birth. You will likely feel very tired for several weeks. Your days will be full of ups and downs, but lots of lizbeth as well. Follow-up care is a key part of your treatment and safety. Be sure to make and go to all appointments, and call your doctor if you are having problems. Its also a good idea to know your test results and keep a list of the medicines you take. How can you care for yourself at home? Take care of your body after delivery  · Use pads instead of tampons for the bloody flow that may last as long as 2 weeks. · Ease cramps with acetaminophen (Tylenol). · Ease soreness of hemorrhoids and the area between your vagina and rectum with ice compresses or witch hazel pads. · Ease constipation by drinking lots of fluid and eating high-fiber foods. Ask your doctor about over-the-counter stool softeners. · Cleanse yourself with a gentle squeeze of warm water from a bottle instead of wiping with toilet paper. · Take a sitz bath in warm water several times a day. · Wear a good nursing bra. Ease sore and swollen breasts with warm, wet washcloths. · If you are not breast-feeding, use ice rather than heat for breast soreness. · Your period may not start for several months if you are breast-feeding. You may bleed more, and longer at first, than you did before you got pregnant. · Wait until you are healed (about 4 to 6 weeks) before you have sexual intercourse. Your doctor will tell you when it is okay to have sex. · Try not to travel with your baby for 5 or 6 weeks. If you take a long car trip, make frequent stops to walk around and stretch. Avoid exhaustion  · Rest every day. Try to nap when your baby naps. · Ask another adult to be with you for a few days after delivery.   · Plan for  if you have other children. · Stay flexible so you can eat at odd hours and sleep when you need to. Both you and your baby are making new schedules. · Plan small trips to get out of the house. Change can make you feel less tired. · Ask for help with housework, cooking, and shopping. Remind yourself that your job is to care for your baby. Know about help for postpartum depression  · \"Baby blues are common for the first 1 to 2 weeks after birth. You may cry or feel sad or irritable for no reason. · Rest whenever you can. Being tired makes it harder to handle your emotions. · Go for walks with your baby. · Talk to your partner, friends, and family about your feelings. · If your symptoms last for more than a few weeks, or if you feel very depressed, ask your doctor for help. · Postpartum depression can be treated. Support groups and counseling can help. Sometimes medicine can also help. Stay healthy  · Eat healthy foods so you have more energy, make good breast milk, and lose extra baby pounds. · If you breast-feed, avoid alcohol and drugs. Stay smoke-free. If you quit during pregnancy, congratulations. · Start daily exercise after 4 to 6 weeks, but rest when you feel tired. · Learn exercises to tone your belly. Do Kegel exercises to regain strength in your pelvic muscles. You can do these exercises while you stand or sit. ¨ Squeeze the same muscles you would use to stop your urine. Your belly and rear end (buttocks) should not move. ¨ Hold the squeeze for 3 seconds, then relax for 3 seconds. ¨ Repeat the exercise 10 to 15 times for each session. Do three or more sessions each day. · Find a class for new mothers and new babies that has an exercise time. · If you had a  birth, give yourself a bit more time before you exercise, and be careful. When should you call for help? Call 911 anytime you think you may need emergency care. For example, call if:  · You have sudden, severe pain in your belly.   · You passed out (lost consciousness). Call your doctor now or seek immediate medical care if:  · You have severe vaginal bleeding. You are passing blood clots and soaking through a pad each hour for 2 or more hours. · Your vaginal bleeding seems to be getting heavier or is still bright red 4 days after delivery, or you pass blood clots larger than the size of a golf ball. · You are dizzy or lightheaded, or you feel like you may faint. · You are vomiting or cannot keep fluids down. · You have a fever. · You have new or more belly pain. · You pass tissue (not just blood). · Your breast or breasts have hard, red, or tender areas. · You have an urgent or frequent need to urinate, along with a burning feeling. · You have severe pain, tenderness, or swelling in your vagina or the area between your rectum and vagina. · You have severe pains in your chest, belly, back, or legs. · You have feelings of severe despair or great anxiety. · Your baby is unusually cranky or is sleeping too much. · Your baby's eyes are red or have discharge. · Your baby has white patches on the roof and sides of the mouth or tongue. · Your baby's umbilical cord is foul-smelling, swollen, red, or leaking pus. · There is blood or mucus in your baby's bowel movements. · Your baby has fewer than 6 wet diapers a day. · Your baby does not want to eat, or your baby is throwing up with every feeding. · Your baby has trouble breathing. · Your baby has a rectal temperature of 100.4°F or more, or an underarm temperature over 99.4°F.  · You baby has a low temperature less than 97.5°F rectal, or less than 97. 0°F underarm. · Your baby's skin looks yellow. Watch closely for changes in your health, and be sure to contact your doctor if you have any problems. Where can you learn more? Go to Attainia.be  Enter A461 in the search box to learn more about \"After Your Delivery (the Postpartum Period): After Your Visit. \"   © 7413-4681 Healthwise, Incorporated. Care instructions adapted under license by [facility] (which disclaims liability or warranty for this information). This care instruction is for use with your licensed healthcare professional. If you have questions about a medical condition or this instruction, always ask your healthcare professional. [address] any warranty or liability for your use of this information. Content Version: 9.3.72973; Last Revised: July 8, 2010    Depression After Childbirth: After Your Visit  Your Care Instructions  Many women get the \"baby blues\" during the first few days after childbirth. They may lose sleep, feel irritable, cry easily, and feel happy one minute and sad the next. Hormone changes are one cause of these emotional changes. Also, the demands of a new baby, coupled with visits from relatives or other family needs, add to a mother's stress. The \"baby blues\" usually peak around the fourth day and then ease up in less than 2 weeks. If your moodiness or anxiety lasts for more than 2 weeks, or if you feel like life is not worth living, you may have postpartum depression. This is different for each mother. Some mothers with serious depression may worry intensely about their infant's well-being, while others may feel distant from their child. Some mothers might even feel that they might harm their baby. A mother may have signs of paranoia, wondering if someone is watching her. Depression is not a sign of weakness. It is a medical condition that requires treatment. Medicine and counseling are often effective at reducing depression. Talk to your doctor about taking antidepressant medicine while breast-feeding. Follow-up care is a key part of your treatment and safety. Be sure to make and go to all appointments, and call your doctor if you are having problems. Its also a good idea to know your test results and keep a list of the medicines you take.   How can you care for yourself at home? · Take your medicines exactly as prescribed. Call your doctor if you think you are having a problem with your medicine. · Eat a balanced diet, so that you can keep up your energy. · Get regular daily exercise, such as walks, to help improve your mood. · Get as much sunlight as possible. Keep your shades and curtains open, and get outside as much as you can. · Avoid using alcohol or other substances to feel better. · Get as much rest and sleep as possible, and avoid doing too much. Being too tired can increase depression. · Play stimulating music throughout your day and soothing music at night. · Schedule outings and visits with friends and family. Ask them to call you regularly, so that you do not feel alone. · Ask for help with preparing food and other daily tasks. Family and friends are often happy to help a mother with a . · Be honest with yourself and those who care about you. Tell them about your struggle. · Join a support group of new mothers. No one can better understand the challenges of caring for a  than other new mothers. When should you call for help? Call 911 anytime you think you may need emergency care. For example, call if:  · You feel you cannot stop from hurting yourself or someone else. Call your doctor now or seek immediate medical care if:  · You are having trouble caring for yourself or your baby. · You have signs of paranoia that can occur with postpartum depression. You fear that someone is watching you, stealing from you, or reading your mind. · You hear voices. · You have symptoms of postpartum depression, such as:  ¨ Sleeplessness. ¨ Anxiety. ¨ Hopelessness. ¨ Irritability. ¨ Poor concentration. · Someone you know has depression and:  ¨ Starts to give away his or her possessions. ¨ Uses illegal drugs or drinks alcohol heavily.   ¨ Talks or writes about death, including writing suicide notes and talking about guns, knives, or pills.  ¨ Starts to spend a lot of time alone. ¨ Acts very aggressively or suddenly appears calm. Watch closely for changes in your health, and be sure to contact your doctor if you have any problems. Where can you learn more? Go to FreshOffice.be  Enter F979 in the search box to learn more about \"Depression After Childbirth: After Your Visit. \"   © 5687-6287 Healthwise, Incorporated. Care instructions adapted under license by [email or web address] (which disclaims liability or warranty for this information). This care instruction is for use with your licensed healthcare professional. If you have questions about a medical condition or this instruction, always ask your healthcare professional. [address] any warranty or liability for your use of this information. Content Version: 9.3.73121; Last Revised: April 18, 2011        Breast Self-Exam: After Your Visit  Your Care Instructions  A breast self-exam is when you check your breasts for lumps or changes. This regular exam helps you learn how your breasts normally look and feel. Most breast problems or changes are not because of cancer. Breast self-exam is not a substitute for a mammogram. Having regular breast exams by your doctor and regular mammograms improve your chances of finding any problems with your breasts. Some women set a time each month to do a step-by-step breast self-exam. Other women like a less formal system. They might look at their breasts as they brush their teeth, or feel their breasts once in a while in the shower. If you notice a change in your breast, tell your doctor. Follow-up care is a key part of your treatment and safety. Be sure to make and go to all appointments, and call your doctor if you are having problems. Its also a good idea to know your test results and keep a list of the medicines you take.   How do you do a breast self-exam?  · The best time to examine your breasts is usually one week after your menstrual period begins. Your breasts should not be tender then. If you do not have periods, you might do your exam on a day of the month that is easy to remember. · To examine your breasts:  ¨ Remove all your clothes above the waist and lie down. When you are lying down, your breast tissue spreads evenly over your chest wall, which makes it easier to feel all your breast tissue. ¨ Use the pads--not the fingertips--of the 3 middle fingers of your left hand to check your right breast. Move your fingers slowly in small coin-sized circles that overlap. ¨ Use three levels of pressure to feel of all your breast tissue. Use light pressure to feel the tissue close to the skin surface. Use medium pressure to feel a little deeper. Use firm pressure to feel your tissue close to your breastbone and ribs. Use each pressure level to feel your breast tissue before moving on to the next spot. ¨ Check your entire breast, moving up and down as if following a strip from the collarbone to the bra line, and from the armpit to the ribs. Repeat until you have covered the entire breast.  ¨ Repeat this procedure for your left breast, using the pads of the 3 middle fingers of your right hand. · To examine your breasts while in the shower:  ¨ Place one arm over your head and lightly soap your breast on that side. ¨ Using the pads of your fingers, gently move your hand over your breast (in the strip pattern described above), feeling carefully for any lumps or changes. ¨ Repeat for the other breast.  · Have your doctor inspect anything you notice to see if you need further testing. Where can you learn more? Go to WiDaPeople.be  Enter P148 in the search box to learn more about \"Breast Self-Exam: After Your Visit. \"   © 4783-2720 Healthwise, Incorporated. Care instructions adapted under license by [facility] (which disclaims liability or warranty for this information).  This care instruction is for use with your licensed healthcare professional. If you have questions about a medical condition or this instruction, always ask your healthcare professional. [place] [address] any warranty or liability for your use of this information. Content Version: 9.3.96511; Last Revised: September 6, 2011     Breast-Feeding: After Your Visit  Your Care Instructions    Breast-feeding has many benefits. It may lower your baby's chances of getting an infection. It also may prevent your baby from having problems such as diabetes and high cholesterol later in life. Breast-feeding also helps you bond with your baby. The American Academy of Pediatrics recommends breast-feeding for at least a year. That may be very hard for many women to do, but breast-feeding even for a shorter period of time is a health benefit to you and your baby. In the first days after birth, your breasts make a thick, yellow liquid called colostrum. This liquid gives your baby nutrients and antibodies against infection. It is all that babies need in the first days after birth. Your breasts will fill with milk a few days after the birth. Breast-feeding is a skill that gets better with practice. It is normal to have some problems. Some women have sore or cracked nipples, blocked milk ducts, or a breast infection (mastitis). But if you feed your baby every 1 to 2 hours during the day and use good breast-feeding methods, you may not have these problems. You can treat these problems if they happen and continue breast-feeding. Follow-up care is a key part of your treatment and safety. Be sure to make and go to all appointments, and call your doctor if you are having problems. Its also a good idea to know your test results and keep a list of the medicines you take. How can you care for yourself at home? · Breast-feed your baby whenever he or she is hungry. In the first 2 weeks, your baby will feed about every 1 to 3 hours.  This will help you keep up your supply of milk. · Put a bed pillow or a nursing pillow on your lap to support your arms and your baby. · Hold your baby in a comfortable position. ¨ You can hold your baby in several ways. One of the most common positions is the cradle hold. One arm supports your baby, with his or her head in the bend of your elbow. Your open hand supports your baby's bottom or back. Your baby's belly lies against yours. ¨ If you had your baby by , or , try the football hold. This position keeps your baby off your belly. Tuck your baby under your arm, with his or her body along the side you will be feeding on. Support your baby's upper body with your arm. With that hand you can control your baby's head to bring his or her mouth to your breast.  ¨ Try different positions with each feeding. If you are having problems, ask for help from your doctor or a lactation consultant. · To get your baby to latch on:  ¨ Support and narrow your breast with one hand using a \"U hold,\" with your thumb on the outer side of your breast and your fingers on the inner side. You can also use a \"C hold,\" with all your fingers below the nipple and your thumb above it. Try the different holds to get the deepest latch for whichever breast-feeding position you use. Your other arm is behind your baby's back, with your hand supporting the base of the baby's head. Position your fingers and thumb to point toward your baby's ears. ¨ You can touch your baby's lower lip with your nipple to get your baby to open his or her mouth. Wait until your baby opens up really wide, like a big yawn. Then be sure to bring the baby quickly to your breast--not your breast to the baby. As you bring your baby toward your breast, use your other hand to support the breast and guide it into his or her mouth. ¨ Both the nipple and a large portion of the darker area around the nipple (areola) should be in the baby's mouth.  The baby's lips should be flared outward, not folded in (inverted). ¨ Listen for a regular sucking and swallowing pattern while the baby is feeding. If you cannot see or hear a swallowing pattern, watch the baby's ears, which will wiggle slightly when the baby swallows. If the baby's nose appears to be blocked by your breast, tilt the baby's head back slightly, so just the edge of one nostril is clear for breathing. ¨ When your baby is latched, you can usually remove your hand from supporting your breast and bring it under your baby to cradle him or her. Now just relax and breast-feed your baby. · You will know that your baby is feeding well when:  ¨ His or her mouth covers a lot of the areola, and the lips are flared out. ¨ His or her chin and nose rest against your breast.  ¨ Sucking is deep and rhythmic, with short pauses. ¨ You are able to see and hear your baby swallowing. ¨ You do not feel pain in your nipple. · If your baby takes only one breast at a feeding, start the next feeding on the other breast.  · Anytime you need to remove your baby from the breast, put one finger in the corner of his or her mouth. Push your finger between your baby's gums to gently break the seal. If you do not break the tight seal before you remove your baby, your nipples can become sore, cracked, or bruised. · After feeding your baby, gently pat his or her back to let out any swallowed air. After your baby burps, offer the breast again, or offer the other breast. Sometimes a baby will want to keep feeding after being burped. When should you call for help? Call your doctor now or seek immediate medical care if:  · You have problems with breast-feeding, such as:  ¨ Sore, red nipples. ¨ Stabbing or burning breast pain. ¨ A hard lump in your breast.  ¨ A fever, chills, or flu-like symptoms.   Watch closely for changes in your health, and be sure to contact your doctor if:  · Your baby has trouble latching on to your breast.  · You continue to have pain or discomfort when breast-feeding. · Your baby wets fewer than 4 diapers a day. · You have other questions or concerns. Where can you learn more? Go to [employer]  Enter P492 in the search box to learn more about \"Breast-Feeding: After Your Visit. \"   © 8851-5626 [employer]. Care instructions adapted under license by [facility] (which disclaims liability or warranty for this information). This care instruction is for use with your licensed healthcare professional. If you have questions about a medical condition or this instruction, always ask your healthcare professional. [place] [address] any warranty or liability for your use of this information.   Content Version: 9.3.03855; Last Revised: February 10, 2012

## 2019-02-19 NOTE — DISCHARGE SUMMARY
Obstetrical Discharge Summary     Name: Howard Rush MRN: [id number]  SSN: [id number]    YOB: 1975  Age: 41 y.o. Sex: female      Admit Date: 2/15/2019    Discharge Date: 2019    Admitting Physician: Saad Arcos MD     Attending Physician:  Kathya Washington MD     Discharge Diagnoses:   Information for the patient's :  Manuel Domingo [[id number]]   Delivery of a 5 lb 11.2 oz (2.586 kg) male infant via , Low Transverse on 2/15/2019 at 11:06 PM  by . Apgars were  and . Information for the patient's :  Manuel Domingo [[id number]]   Delivery of a 5 lb 11.2 oz (2.586 kg) male infant via , Low Transverse on 2/15/2019 at 11:06 PM  by . Apgars were  and . Additional Diagnoses:   Problem List as of 2019 Never Reviewed          Codes Class Noted - Resolved    Pregnancy ICD-10-CM: Z34.90  ICD-9-CM: V22.2  2019 - Present        Placenta previa affecting delivery ICD-10-CM: O44.00  ICD-9-CM: 641.11  2019 - Present              Lab Results   Component Value Date/Time    Rubella, External Immune 2018     Recent Labs     19  0610   HGB 7.9[facility] Course: Normal hospital course following the delivery. Patient Instructions:   Current Discharge Medication List      START taking these medications    Details   ascorbic acid, vitamin C, (VITAMIN C) 500 mg tablet Take 0.5 Tabs by mouth two (2) times a day. Qty: 100 Tab, Refills: 1    Associated Diagnoses: S/P emergency  section      ferrous sulfate 325 mg (65 mg iron) tablet Take 1 Tab by mouth two (2) times a day. Qty: 100 Tab, Refills: 1    Associated Diagnoses: S/P emergency  section      oxyCODONE-acetaminophen (PERCOCET) 5-325 mg per tablet Take 1-2 Tabs by mouth every eight to twelve (8-12) hours as needed. Max Daily Amount: 6 Tabs.   Qty: 25 Tab, Refills: 0    Associated Diagnoses: S/P emergency  section         CONTINUE these medications which have NOT CHANGED    Details   prenatal vit-iron fumarate-fa (RIGHT STEP PRENATAL VITAMINS) 27 mg iron- 0.8 mg tab tablet Take 1 Tab by mouth daily. STOP taking these medications       aspirin delayed-release 81 mg tablet Comments:   Reason for Stopping: Follow-up Appointments   Procedures    FOLLOW UP VISIT Appointment in: Two Weeks     Standing Status:   Standing     Number of Occurrences:   1     Order Specific Question:   Appointment in     Answer:    Two Weeks        Signed By:  Shravan Jacob MD     February 19, 2019 2:27 PM                      BST

## 2019-02-19 NOTE — PROGRESS NOTES
0710: Verbal and bedside report received from offgoing RN,MOLINA Green, using SBAR, Kardex, and MAR. 
 
3333: Discharge instructions reviewed with patient, discussed DC medications. Patient verbalized understanding and copy of DC instructions given to patient. Discharged to [facility] Stay.

## 2019-02-19 NOTE — PROGRESS NOTES
Asked to meet with pt regarding community resources for outpt counseling for ongoing depression. Pt is relatively  new to the area. Provided pt with resources and name of [facility][note 3] for outpt therapy. Pt states she prefers counseling over meds. Discussed having a new baby and normalized pt's feelings. Pt has a good amount of family support. Pt currently living with her 2 grown children, 5yo daughter and son's spouse. Pt is planning on going to [facility] for baby and will put baby on Medicaid. Pt states she has supplies for baby and is having a shower this weekend. If pt is still in the hospital tomorrow, will stop by for any additional questions. Scott Heath, [phone]

## 2019-02-19 NOTE — PROGRESS NOTES
82 Pt stating that she is still having clots the size of a quarter when up to void. Pt noted to have small rubra lochia. Attempted to check fundus, however pt would not let this nurse palpate the fundus adequately. From the brief palpation the fundus felt soft, -2 below U. Pt refused further fundal exam at which point this nurse stopped. Pt was educated regarding the fundus needing to be firm and the need for a fundal massage and risks associated with refusing this. Pt still refused. [phone] Dr. Eisenberg Pleasure notified at this time of pt status, clots and pt refusal for fundal exam.  Physician advising to continue current plan of care and states that she will look at the repeat H/H in the morning once it has resulted after being drawn.

## 2023-08-07 NOTE — ROUTINE PROCESS
Bedside and Verbal shift change report given to MOLINA Lomeli RN (oncoming nurse) by Ankita Gonzales RN (offgoing nurse). Report included the following information SBAR, Intake/Output, MAR and Recent Results. 27

## (undated) DEVICE — SUTURE PLN GUT SZ 3-0 L27IN ABSRB YELLOWISH TAN L70MM XLH 52T

## (undated) DEVICE — SUT CHRMC 1 36IN CT1 BRN --

## (undated) DEVICE — SUTURE MCRYL SZ 3-0 L27IN ABSRB UD L60MM KS STR REV CUT Y523H

## (undated) DEVICE — SUTURE VCRL SZ 1 L36IN ABSRB VLT L36MM CT-1 1/2 CIR J347H

## (undated) DEVICE — (D)STRIP SKN CLSR 0.5X4IN WHT --

## (undated) DEVICE — PACK PROCEDURE SURG BIRTH

## (undated) DEVICE — SUTURE CHROMIC GUT SZ 2-0 L36IN ABSRB BRN L36MM CT-1 1/2 923H

## (undated) DEVICE — REM POLYHESIVE ADULT PATIENT RETURN ELECTRODE: Brand: VALLEYLAB

## (undated) DEVICE — SPONGE LAP 18X18IN STRL -- 5/PK